# Patient Record
Sex: FEMALE | Race: WHITE | HISPANIC OR LATINO | Employment: PART TIME | URBAN - METROPOLITAN AREA
[De-identification: names, ages, dates, MRNs, and addresses within clinical notes are randomized per-mention and may not be internally consistent; named-entity substitution may affect disease eponyms.]

---

## 2017-02-01 ENCOUNTER — ALLSCRIPTS OFFICE VISIT (OUTPATIENT)
Dept: PERINATAL CARE | Facility: OTHER | Age: 32
End: 2017-02-01
Payer: COMMERCIAL

## 2017-02-01 ENCOUNTER — GENERIC CONVERSION - ENCOUNTER (OUTPATIENT)
Dept: OTHER | Facility: OTHER | Age: 32
End: 2017-02-01

## 2017-02-01 PROCEDURE — 76817 TRANSVAGINAL US OBSTETRIC: CPT | Performed by: OBSTETRICS & GYNECOLOGY

## 2017-02-01 PROCEDURE — 76805 OB US >/= 14 WKS SNGL FETUS: CPT | Performed by: OBSTETRICS & GYNECOLOGY

## 2017-03-29 ENCOUNTER — ALLSCRIPTS OFFICE VISIT (OUTPATIENT)
Dept: PERINATAL CARE | Facility: OTHER | Age: 32
End: 2017-03-29
Payer: COMMERCIAL

## 2017-03-29 ENCOUNTER — GENERIC CONVERSION - ENCOUNTER (OUTPATIENT)
Dept: OTHER | Facility: OTHER | Age: 32
End: 2017-03-29

## 2017-03-29 PROCEDURE — 76816 OB US FOLLOW-UP PER FETUS: CPT | Performed by: OBSTETRICS & GYNECOLOGY

## 2017-05-24 ENCOUNTER — GENERIC CONVERSION - ENCOUNTER (OUTPATIENT)
Dept: OTHER | Facility: OTHER | Age: 32
End: 2017-05-24

## 2017-05-24 ENCOUNTER — APPOINTMENT (OUTPATIENT)
Dept: PERINATAL CARE | Facility: CLINIC | Age: 32
End: 2017-05-24
Payer: COMMERCIAL

## 2017-05-24 DIAGNOSIS — O24.410 GESTATIONAL DIABETES MELLITUS, DIET-CONTROLLED: ICD-10-CM

## 2017-05-24 PROCEDURE — G0108 DIAB MANAGE TRN  PER INDIV: HCPCS | Performed by: OBSTETRICS & GYNECOLOGY

## 2017-05-30 ENCOUNTER — GENERIC CONVERSION - ENCOUNTER (OUTPATIENT)
Dept: OTHER | Facility: OTHER | Age: 32
End: 2017-05-30

## 2017-06-07 ENCOUNTER — ALLSCRIPTS OFFICE VISIT (OUTPATIENT)
Dept: PERINATAL CARE | Facility: OTHER | Age: 32
End: 2017-06-07
Payer: COMMERCIAL

## 2017-06-07 ENCOUNTER — GENERIC CONVERSION - ENCOUNTER (OUTPATIENT)
Dept: OTHER | Facility: OTHER | Age: 32
End: 2017-06-07

## 2017-06-07 PROCEDURE — 76816 OB US FOLLOW-UP PER FETUS: CPT | Performed by: OBSTETRICS & GYNECOLOGY

## 2018-01-10 NOTE — PROGRESS NOTES
MAR 29 2017         RE: Lissa Alcantara                                   To: TODD Ledezma    MR#: [id number]                                   [address] [place] [address] Drive   : [address] [facility]   SS#:                                              Fax: [phone]: [phone]   (Exam #: [id number])      The LMP of this 27year old,  G4, P3-0-0-3 patient was SEP 23 2016, her   working ISSAC is 2017 and the current gestational age is 35 weeks 1   day by [address] [place] [address]  A sonographic examination was performed on MAR   29 2017 using real time equipment  The ultrasound examination was   performed using abdominal technique  The patient has a BMI of 25 7  Her   blood pressure today was 105/65  Earliest ultrasound found in her record: MFM 16  8w6d  ISSAC 17      Cardiac motion was observed at 131 bpm       INDICATIONS      smoker   asthma   fetal growth   Evaluate missed anatomy      Exam Types      Level I      RESULTS      Fetus # 1 of 1   Breech presentation   Fetal growth appeared normal   Placenta Location = Posterior   Placenta Grade = II      MEASUREMENTS (* Included In Average GA)      AC              23 7 cm        27 weeks 6 days* (45%)   BPD              6 8 cm        27 weeks 3 days* (27%)   HC              25 9 cm        27 weeks 6 days* (34%)   Femur            5 8 cm        30 weeks 2 days* (79%)      Cerebellum       3 5 cm        30 weeks 6 days      HC/AC           1 09   FL/AC           0 25   FL/BPD          0 85   EFW (Ac/Fl/Hc)  1283 grams - 2 lbs 13 oz                 (59%)      THE AVERAGE GESTATIONAL AGE is 28 weeks 3 days +/- 14 days        AMNIOTIC FLUID      Q1: 4 4      Q2: 2 4      Q3: 4 7      Q4: 6 4   ROLANDO Total = 18 0 cm   Amniotic Fluid: Normal      ANATOMY DETAILS      Visualized Appearing Sonographically Normal:   HEAD: (Calvarium, BPD Level, Cavum, Lateral Ventricles, Choroid Plexus, Cerebellum, Cisterna Magna);    TH  CAV  : (Lungs, Diaphragm); HEART:   (Four Chamber View, Proximal Left Outflow, Proximal Right Outflow, 3VV,   Aortic Arch, Interventricular Septum, Interatrial Septum, Cardiac Axis,   Cardiac Position);    STOMACH, RIGHT KIDNEY, LEFT KIDNEY, BLADDER, PLACENTA      ANATOMY COMMENTS      The prior fetal anatomic survey was limited the area of the 3VV, Aortic   Arch and Septum  These anatomic views were seen today as sonographically   normal within the inherent limitations of fetal ultrasound  IMPRESSION      Corral IUP   28 weeks and 3 days by this ultrasound  (ISSAC=JUN 18 2017)   28 weeks and 1 day by 1st Tri Sono  (ISSAC=JUN 20 2017)   Breech presentation   Fetal growth appeared normal   Regular fetal heart rate of 131 bpm   Posterior placenta      GENERAL COMMENT      On exam today the patient appears well, in no acute distress, and denies   any complaints  Her abdomen is non-tender  The fetal anatomic survey is now complete  There is no sonographic   evidence of fetal abnormalities at this time  The remainder of the survey   was completed previously  There has been appropriate interval fetal   growth  Good fetal movement and tone are seen  The amniotic fluid volume   appears normal   The placenta is posterior and it appears sonographically   normal   The patient was informed of today's findings and all of her   questions were answered  The limitations of ultrasound were reviewed with   the patient, which she accepts  Precautions and fetal kick counts were   reviewed  We discussed appropriate follow-up in detail and I recommend the patient   return as clinically indicated      Please note, in addition to the time spent discussing the results of the   ultrasound, approximately 10 minutes were spent in consultation with the   patient and coordination of care, with greater than 50% of the time spent   in direct face-to-face counseling        Thank you very much for allowing us to participate in the care of this   very nice patient  Should you have any questions, please do not hesitate   to contact our office  Sami Ferguson, TODD Francis     Electronically signed 03/29/17 08:45

## 2018-01-12 NOTE — PROGRESS NOTES
2016         RE: Ania Lui                                   To: TODD Parham ANDREW    MR#: [id number]                                   [address] [place] [address] Drive   : [address] [facility]  [address]   ENC: [phone]                             Fax: [phone]   (Exam #: [id number])      The LMP of this 27year old,  G4, P3-0-0-3 patient was SEP 23 2016, her   working ISSAC is 2017 and the current gestational age is 11 weeks 6   days by [address] [place] [address]  A sonographic examination was performed on 2016 using real time equipment  The ultrasound examination was   performed using abdominal technique  The patient has a BMI of 23 9  Her   blood pressure today was 104/72  Earliest ultrasound found in her record: MFM 16  8w6d  ISSAC 17      Thank you very much for your kind referral of Ania Lui to the [facility] in [facility] on 2016 for first arrester ultrasound   evaluation and  assessment  Joselyn Wilson is a 59-year-old  4 para   200  female who is currently at 8 0/7 weeks gestation by   menstrual dating  Her prenatal course so far has been unremarkable  Joselyn Wilson   has no complaints  She denies vaginal bleeding  Joselyn Wilson has not yet   received the influenza vaccine during this pregnancy  Joselyn Wilson has a history of 3 prior vaginal deliveries at term between 3 and 14   years ago following uncomplicated prenatal courses  each of her children   is currently healthy  Her past medical history significant for mild,   intermittent asthma, for which she uses an albuterol inhaler as needed  She also has a history depression and an anxiety disorder, neither   currently treated medically  Her past surgical history is negative  She   takes no other medication with the exception of a prenatal vitamin on a   daily basis and has no known drug allergy   She smokes 5-6 cigarettes per   day but denies alcohol or illicit drug use during the pregnancy  The   family genetic history is negative with respect to genetic abnormalities,   birth defects, or mental retardation  Her mom, dad, brother, and sister   have hypertension  The family medical history is negative with respect to   first degree relatives with diabetes or venous thromboembolism  Multiple longitudinal and transverse sections revealed a corral   intrauterine pregnancy  A normal gestational sac was documented  A normal fetal pole was   visualized  Cardiac motion was observed at 163 bpm  The yolk sac was seen,   measuring 0 30 cm  The amnion was also documented  INDICATIONS      confirm gestational age      Exam Types      Level I      MEASUREMENTS (* Included In Average GA)      CRL              2 2 cm        8 weeks 6 days *      THE AVERAGE GESTATIONAL AGE is 8 weeks 6 days +/- 5 days  ANATOMY COMMENTS      Anatomic detail is extremely limited at this gestational age  However, a   discrete fetal pole with cardiac and fetal body motion was documented  Limb buds were documented as well  The yolk sac was clearly seen, and the   gestational sac is normal in appearance and located in the fundus of the   uterus  No gross abnormalities were noted on this examination  Free fluid   is not seen in the posterior cul-de-sac  There is no suspicion of a   subchorionic hematoma  The predicted estimated due date based upon today's   study is June 20, 2017  ADNEXA      The left ovary appeared normal and measured 3 4 x 2 5 x 2 0 cm with a   volume of 8 9 cc  The right ovary appeared normal and measured 2 8 x 2 1 x   2 3 cm with a volume of 7 1 cc        IMPRESSION      Corral IUP   8 weeks and 6 days by this ultrasound  (ISSAC=JUN 20 2017)   8 weeks and 6 days by [email or web address] Tri Sono  (ISSAC=JUN 20 2017)   Regular fetal heart rate of 163 bpm      GENERAL COMMENT      Today's ultrasound findings and suggested follow-up were discussed in   detail with Griffin Vo  She will return to the [facility]  at [facility] in mid December for follow-up MFM evaluation for late first   trimester fetal anatomy evaluation  Genetic screening options will be   discussed that that appointment  Level II ultrasound evaluation will be   performed at about 20 weeks gestation  We discussed that tobacco use   during pregnancy is associated with an increased risk for adverse   pregnancy outcomes, including  delivery, IUGR, abruption, and   stillbirth  We also discussed the importance of receiving the influenza   vaccine during pregnancy  The face to face time, in addition to time spent discussing ultrasound   results, was approximately 10 minutes, greater than 50% of which was spent   during counseling and coordination of care  JOSI Hussein M D     Maternal-Fetal Medicine   Electronically signed 16 09:09

## 2018-01-12 NOTE — PROGRESS NOTES
Active Problems   1  Establish gestational age, ultrasound (V28 3) (Z41)    Family History  Mother   1  Family history of asthma (V17 5) (Z82 5)  2  Family history of hypertension (V17 49) (Z82 49)  Father   3  Family history of asthma (V17 5) (Z82 5)  4  Family history of hypertension (V17 49) (Z82 49)  Sister   5  Family history of asthma (V17 5) (Z82 5)  Brother   6  Family history of asthma (V17 5) (Z82 5)    Social History    · Current every day smoker (305 1) (F17 200)    Current Meds  1  Albuterol Sulfate (2 5 MG/3ML) 0 083% Inhalation Nebulization Solution; Therapy: (Recorded:2016) to Recorded  2  Prenatal TABS; Therapy: (Recorded:2016) to Recorded    Allergies   1  No Known Drug Allergies   2  Animal dander - Cats  3  Animal dander - Dogs  4  FRUIT  5  Peanuts  6  Soy    Provider Comments  Provider Comments:   ISSAC: 2017  EGA: 36 1/7 weeks    Dear Merline Georgia, CRNP:    Your patient, Jhonathan Keen was seen for diabetes and pregnancy counseling and management  As the patient is greater than 36 weeks gestation, her primary treatment for gestational diabetes will be blood glucose monitoring, no concentrated sweets diet and insulin therapy, if needed  The following was reviewed with the patient:    Â· Blood glucose testing during pregnancy: fasting (goal 60 mg/dl to 90 mg/dl), two hour post prandial (goal less than 120 mg/dl)  Patient was instructed on use of the Accu-Chek Felipa blood glucose meter  Fasting blood glucose today was 93 mg/dl  Â· No concentrated sweets diet  Consume protein with every meal and snack  Â· Complications of hyperglycemia during pregnancy including  hypoglycemia and stillbirth  Â· Maternal-fetal surveillance for diabetes care during pregnancy  The following discharged plan was established:     Â· Level I ultrasounds every four weeks at the [facility]  Â· Non-stress testing two times weekly and ROLANDO testing weekly  Â· HbA1c every 6 to 8 weeks (ordered) and mailed to patient  Â· Call my office Friday, 5/26/2017 for evaluation of blood glucose levels  Thank you for the opportunity to participate in the care of this patient  I can be reached at [phone] should you have any questions  Time spent with patient 9:50-10:45 AM; time spent face to face counseling greater than 50% of the appointment      Sincerely,   Abhilash Keith, MS, RD, CDE, LDN  Diabetes Educator  Diabetes [facility] Pregnancy [facility]                Signatures   Electronically signed by : Marina Leyden, RD; May 24 2017 12:37PM EST                       (Author)    Electronically signed by : Pelon Bro; May 24 2017  2:46PM EST                       (Author)    Electronically signed by : Pelon Bro; May 24 2017  2:46PM EST                       (Author)

## 2018-01-13 VITALS
DIASTOLIC BLOOD PRESSURE: 65 MMHG | HEIGHT: 66 IN | BODY MASS INDEX: 24.59 KG/M2 | WEIGHT: 153 LBS | SYSTOLIC BLOOD PRESSURE: 102 MMHG

## 2018-01-13 VITALS
SYSTOLIC BLOOD PRESSURE: 105 MMHG | WEIGHT: 159.05 LBS | HEIGHT: 66 IN | DIASTOLIC BLOOD PRESSURE: 65 MMHG | BODY MASS INDEX: 25.56 KG/M2

## 2018-01-14 VITALS
SYSTOLIC BLOOD PRESSURE: 119 MMHG | BODY MASS INDEX: 25.72 KG/M2 | HEIGHT: 66 IN | DIASTOLIC BLOOD PRESSURE: 79 MMHG | WEIGHT: 160.06 LBS

## 2018-01-14 NOTE — PROGRESS NOTES
DEC 13 2016         RE: Divine Jimenez                                   To: TODD Elam ANDREW    MR#: [id number]                                   [address]   : [address] [facility],  [address]   Mariela Kalee: [phone]                             Fax: [phone]   (Exam #: [id number])      The LMP of this 27year old,  G4, P3-0-0-3 patient was SEP 23 2016, her   working ISSAC is 2017 and the current gestational age is 17 weeks 0   days by  [facility] [address]  A sonographic examination was performed on DEC   13 2016 using real time equipment  The ultrasound examination was   performed using abdominal technique  The patient has a BMI of 23 9  Her   blood pressure today was 106/72  Earliest ultrasound found in her record: MFM 16  8w6d  ISSAC 17   Multiple longitudinal and transverse sections revealed a hammond   intrauterine pregnancy with the fetus in variable presentation  The   placenta is posterior in implantation, grade 0 in appearance  Cardiac motion was observed at 164 bpm       INDICATIONS      first trimester screening   smoker   asthma      Exam Types      Level I   Transvaginal      RESULTS      Fetus # 1 of 1   Variable presentation   Fetal growth appeared normal      MEASUREMENTS (* Included In Average GA)      CRL              6 8 cm        12 weeks 6 days*   Nuchal Trans    1 70 mm      THE AVERAGE GESTATIONAL AGE is 12 weeks 6 days +/- 7 days  ANATOMY COMMENTS      Anatomic detail is limited at this gestational age  The yolk sac was not   noted  The fetal cranium appeared normal in shape and the nuchal   translucency was normal in size (1 7mm)  The nasal bone appears to be   present  The intracranial anatomy was unremarkable  Evaluation of the   spine revealed no obvious evidence for a neural tube defect  Anatomy of   the fetal thorax appeared within normal limits  The cardiac rhythm was   regular  Within the abdomen, stomach & bladder were visualized and the   abdominal wall appeared intact  A three vessel cord appears to be present  Active movement of the fetal body & extremities was seen  There is no   suspicion of a subchorionic bleed  The placental cord insertion was   normal    There is no suspicion of a uterine myoma  Free fluid is not seen   in the posterior cul-de-sac  ADNEXA      The left ovary appeared normal and measured 2 4 x 2 0 x 1 8 cm with a   volume of 4 5 cc  The right ovary appeared normal and measured 2 0 x 1 8 x   1 6 cm with a volume of 3 0 cc       AMNIOTIC FLUID         Largest Vertical Pocket = 2 7 cm   Amniotic Fluid: Normal      IMPRESSION      Corral IUP   12 weeks and 6 days by this ultrasound  (ISSAC=JUN 21 2017)   13 weeks and 0 days by 1st Tri Sono  (ISSAC=JUN 20 2017)   Variable presentation   Fetal growth appeared normal   Regular fetal heart rate of 164 bpm   Posterior placenta      GENERAL COMMENT      Carlie Oquendo reports her NIPT screen was normal and the fetus is female  Recommend a 20 week US for anatomy  JOSI Grant M D     Maternal-Fetal Medicine   Electronically signed 12/14/16 00:25

## 2018-01-15 NOTE — PROGRESS NOTES
2017         RE: Brandy Rueda                                   To: RealTODD Iyer    MR#: [id number]                                   [address]   : [place], [address]   SS#:                                              Fax: Jillian Davalos: [phone]   (Exam #: [id number])      The LMP of this 27year old,  G4, P3-0-0-3 patient was SEP 23 2016, her   working ISSAC is 2017 and the current gestational age is 37 weeks 1   day by [address] [place] [address]  A sonographic examination was performed on 2017 using real time equipment  The ultrasound examination was performed   using abdominal technique  The patient has a BMI of 26 0  Her blood   pressure today was 119/79  Earliest ultrasound found in her record: MFM 16  8w6d  ISSAC 17      Cardiac motion was observed at 125 bpm       INDICATIONS      smoker   asthma   fetal growth   diabetes, gestational, A1      Exam Types      Level I      RESULTS      Fetus # 1 of 1   Vertex presentation   Fetal growth appeared normal   Placenta Location = Posterior   No placenta previa   Placenta Grade = II      MEASUREMENTS (* Included In Average GA)      AC              33 5 cm        37 weeks 4 days* (49%)   BPD              8 9 cm        36 weeks 1 day * (30%)   HC              32 3 cm        36 weeks 0 days* (16%)   Femur            7 3 cm        37 weeks 0 days* (45%)      HC/AC           0 96   FL/AC           0 22   FL/BPD          0 82   EFW (Ac/Fl/Hc)  3143 grams - 6 lbs 15 oz                 (42%)      THE AVERAGE GESTATIONAL AGE is 36 weeks 5 days +/- 21 days        AMNIOTIC FLUID      Q1: 2 1      Q2: 4 4      Q3: 3 2      Q4: 3 8   ROLANDO Total = 13 5 cm   Amniotic Fluid: Normal      ANATOMY DETAILS      Visualized Appearing Sonographically Normal:   HEART: (Four Chamber View, Proximal Left Outflow, Proximal Right Outflow,   3VV, 3 Vessel Trachea, Cardiac Axis, Cardiac Position);    STOMACH, RIGHT   KIDNEY, LEFT KIDNEY, BLADDER, PLACENTA      Visualized:   HEAD: (Calvarium, BPD Level)      Not Visualized:   HEAD: (Cavum, Lateral Ventricles, Choroid Plexus, Cerebellum, Cisterna   Magna)      IMPRESSION      Corral IUP   36 weeks and 5 days by this ultrasound  (ISSAC=2017)   38 weeks and 1 day by 1st Tri Sono  (ISSAC=2017)   Vertex presentation   Fetal growth appeared normal   Regular fetal heart rate of 125 bpm   Posterior placenta   No placenta previa      GENERAL COMMENT      On exam today the patient appears well, in no acute distress, and denies   any complaints  Her abdomen is non-tender  There has been appropriate interval fetal growth  Good fetal movement and   tone are seen  The amniotic fluid volume appears normal   The placenta is   posterior and it appears sonographically normal   The anatomic structures   listed above could not be optimally imaged today because of fetal   position; however, these structures were seen on a prior ultrasound and   appeared sonographically normal   The patient was informed of today's   findings and all of her questions were answered  The limitations of   ultrasound were reviewed with the patient  Fetal kick counts were   reviewed  The patient is an A1 gestational diabetic   surveillance is not   required for this group of patients as long as there is appropriate fetal   growth and normal glucose values  The patient was informed of today's   findings and all of her questions were answered  Recommend that the   patient be delivered by 41  weeks gestation; however, if delivery is   delayed beyond 40 weeks gestation,  testing should be initiated   at that time  Thank you very much for allowing us to participate in the care of this   very nice patient  Should you have any questions, please do not hesitate   to contact our office  JOSI Herrera , JOSI COOPER  TODD Burns     Electronically signed 06/07/17 08:51

## 2018-01-15 NOTE — PROGRESS NOTES
Fluid: Normal      CERVICAL EVALUATION      The cervix appeared normal (Ultrasound Examination)  SUPINE      Cervical Length: 4 30 cm      OTHER TEST RESULTS           Funneling?: No             Dynamic Changes?: No        Resp  To TFP?: No      ANATOMY      Head                                    Normal   Face/Neck                               Normal   Th  Cav  Normal   Heart                                   See Details   Abd  Cav  Normal   Stomach                                 Normal   Right Kidney                            Normal   Left Kidney                             Normal   Bladder                                 Normal   Abd  Wall                               Normal   Spine                                   Normal   Extrems                                 Normal   Genitalia                               Normal   Placenta                                Normal   Umbl  Cord                              Normal   Uterus                                  Normal   PCI                                     Normal      ANATOMY DETAILS      Visualized Appearing Sonographically Normal:   HEAD: (Calvarium, BPD Level, Cavum, Lateral Ventricles, Choroid Plexus,   Cerebellum, Cisterna Magna);    FACE/NECK: (Neck, Nuchal Fold, Profile,   Orbits, Nose/Lips, Palate, Face);    TH  CAV  : (Lungs, Diaphragm); HEART: (Four Chamber View, Proximal Left Outflow, Proximal Right Outflow,   3VV, Short [place] of Greater Vessels, Ductal Arch, IVC, SVC, Cardiac Axis,   Cardiac Position);    ABD  CAV : (Liver);    STOMACH, RIGHT KIDNEY, LEFT   KIDNEY, BLADDER, ABD  WALL, SPINE: (Cervical Spine, Thoracic Spine, Lumbar   Spine, Sacrum);    EXTREMS: (Lt Humerus, Rt Humerus, Lt Forearm, Rt   Forearm, Lt Hand, Rt Hand, Lt Femur, Rt Femur, Lt Low Leg, Rt Low Leg, Lt   Foot, Rt Foot);    GENITALIA, PLACENTA, UMBL   CORD, UTERUS, PCI      Visualized:   HEART: (3 Vessel Trachea, Aortic Arch)      Not Visualized:   HEART: (Interventricular Septum, Interatrial Septum)      ADNEXA      The left ovary appeared normal and measured 3 1 x 2 6 x 1 8 cm with a   volume of 7 6 cc  The right ovary appeared normal and measured 3 2 x 2 2 x   1 7 cm with a volume of 6 3 cc  IMPRESSION      Corral IUP   20 weeks and 1 day by this ultrasound  (ISSAC=JUN 20 2017)   20 weeks and 1 day by 1st Tri Sono  (ISSAC=JUN 20 2017)   Transverse presentation   Fetal growth appeared normal   Regular fetal heart rate of 142 bpm   Posterior placenta   No placenta previa      GENERAL COMMENT      On exam today the patient appears well, in no acute distress, and denies   any complaints  Her abdomen is non-tender  Today's ultrasound is limited by fetal position; therefore, the fetal   anatomic survey could not be completed  Good fetal movement and tone are   seen  The amniotic fluid volume appears normal   The placenta is   posterior and it appears sonographically normal   A transvaginal   ultrasound was performed to assess the cervix, which was not seen well   transabdominally  The cervical length was 4 3 centimeters, which is   normal for the current gestational age  There was no significant   funneling or dynamic changes appreciated  The limitations of ultrasound   were reviewed with the patient, which she appears to understand and   accepts  She was informed of today's findings and all of her questions   were answered  We discussed appropriate follow-up and I recommend Jenaro Boone return in 8 weeks   for a fetal growth evaluation and to complete the fetal anatomic survey  We again discussed the importance of tobacco cessation  Please note, in addition to the time spent discussing the results of the   ultrasound, I spent approximately 10 minutes of face-to-face time with the   patient, greater than 50% of which was spent in counseling and the   coordination of care for this patient  Thank you very much for allowing us to participate in the care of this   very nice patient  Should you have any questions, please do not hesitate   to contact our office  JOSI Santillan M D     Electronically signed 02/01/17 10:44

## 2018-01-22 VITALS
SYSTOLIC BLOOD PRESSURE: 115 MMHG | HEIGHT: 66 IN | BODY MASS INDEX: 26.29 KG/M2 | DIASTOLIC BLOOD PRESSURE: 64 MMHG | WEIGHT: 163.6 LBS

## 2018-03-27 LAB
ABSOL LYMPHOCYTES (HISTORICAL): 3.4 K/UL (ref 0.5–4)
ALBUMIN SERPL BCP-MCNC: 3.7 G/DL (ref 3–5.2)
ALP SERPL-CCNC: 51 U/L (ref 43–122)
ALT SERPL W P-5'-P-CCNC: 33 U/L (ref 9–52)
ANION GAP SERPL CALCULATED.3IONS-SCNC: 11 MMOL/L (ref 5–14)
AST SERPL W P-5'-P-CCNC: 20 U/L (ref 14–36)
BASOPHILS # BLD AUTO: 0 K/UL (ref 0–0.1)
BASOPHILS # BLD AUTO: 1 % (ref 0–1)
BILIRUB SERPL-MCNC: 0.2 MG/DL
BUN SERPL-MCNC: 13 MG/DL (ref 5–25)
CALCIUM SERPL-MCNC: 8.9 MG/DL (ref 8.4–10.2)
CHLORIDE SERPL-SCNC: 106 MEQ/L (ref 97–108)
CHOLEST SERPL-MCNC: 190 MG/DL
CHOLEST/HDLC SERPL: 6.6 {RATIO}
CO2 SERPL-SCNC: 22 MMOL/L (ref 22–30)
CREATINE, SERUM (HISTORICAL): 0.59 MG/DL (ref 0.6–1.2)
DEPRECATED RDW RBC AUTO: 13 %
EGFR (HISTORICAL): >60 ML/MIN/1.73 M2
EOSINOPHIL # BLD AUTO: 0.2 K/UL (ref 0–0.4)
EOSINOPHIL NFR BLD AUTO: 2 % (ref 0–6)
GLUCOSE FASTING (HISTORICAL): 93 MG/DL (ref 70–99)
HCT VFR BLD AUTO: 39.4 % (ref 36–46)
HDLC SERPL-MCNC: 29 MG/DL
HGB BLD-MCNC: 13.2 G/DL (ref 12–16)
LDL/HDL RATIO (HISTORICAL): ABNORMAL
LDLC SERPL CALC-MCNC: ABNORMAL MG/DL
LYMPHOCYTES NFR BLD AUTO: 46 % (ref 25–45)
MCH RBC QN AUTO: 30.4 PG (ref 26–34)
MCHC RBC AUTO-ENTMCNC: 33.4 % (ref 31–36)
MCV RBC AUTO: 91 FL (ref 80–100)
MONOCYTES # BLD AUTO: 0.5 K/UL (ref 0.2–0.9)
MONOCYTES NFR BLD AUTO: 7 % (ref 1–10)
NEUTROPHILS ABS COUNT (HISTORICAL): 3.2 K/UL (ref 1.8–7.8)
NEUTS SEG NFR BLD AUTO: 44 % (ref 45–65)
PLATELET # BLD AUTO: 293 K/MCL (ref 150–450)
POTASSIUM SERPL-SCNC: 3.8 MEQ/L (ref 3.6–5)
RBC # BLD AUTO: 4.34 M/MCL (ref 4–5.2)
SODIUM SERPL-SCNC: 139 MEQ/L (ref 137–147)
TEST INFORMATION (HISTORICAL): NORMAL
TEST RESULT (HISTORICAL): NORMAL
TOTAL PROTEIN (HISTORICAL): 6.5 G/DL (ref 5.9–8.4)
TRIGL SERPL-MCNC: 779 MG/DL
TSH SERPL DL<=0.05 MIU/L-ACNC: 1.4 UIU/ML (ref 0.47–4.68)
VLDLC SERPL CALC-MCNC: ABNORMAL MG/DL (ref 0–40)
WBC # BLD AUTO: 7.2 K/MCL (ref 4.5–11)

## 2018-04-06 LAB
5-HIAA 24 HOUR URINE (HISTORICAL): 3
CATECHOLAMINES, TOTAL (HISTORICAL): ABNORMAL
CREATININE 24 HOUR URINE (HISTORICAL): 1300
CREATININE, UR (HISTORICAL): 52
DOPAMINE 24 HOUR URINE (HISTORICAL): 65
DOPAMINE, RAND UR (HISTORICAL): 26
EPINEPHRINE 24 HOUR URINE (HISTORICAL): <2
EPINEPHRINE, RAND UR (HISTORICAL): <1
INTERPRETATION (HISTORICAL): NORMAL
Lab: 1.2
Lab: 12
Lab: 173
Lab: 2
Lab: 50
Lab: 63
Lab: <2
Lab: NORMAL
METANEPHRINES TOTAL 24 HOUR URINE (HISTORICAL): 82
METANEPHRINES URINE (HISTORICAL): 33
NOREPINEPH, RAND UR (HISTORICAL): 6
NOREPINEPHRINE 24 HOUR URINE (HISTORICAL): 15
NORMETANEPHRINE 24 HOUR URINE (HISTORICAL): 225
NORMETANEPHRINE URINE (HISTORICAL): 90
TIME (HOURS) (HISTORICAL): 24
TOTAL URINE VOLUME (HISTORICAL): 2500

## 2019-10-18 ENCOUNTER — HOSPITAL ENCOUNTER (EMERGENCY)
Facility: HOSPITAL | Age: 34
Discharge: HOME | End: 2019-10-18
Attending: EMERGENCY MEDICINE
Payer: COMMERCIAL

## 2019-10-18 VITALS
BODY MASS INDEX: 24.16 KG/M2 | HEART RATE: 91 BPM | RESPIRATION RATE: 18 BRPM | TEMPERATURE: 97.3 F | OXYGEN SATURATION: 100 % | WEIGHT: 149.69 LBS | SYSTOLIC BLOOD PRESSURE: 122 MMHG | DIASTOLIC BLOOD PRESSURE: 78 MMHG

## 2019-10-18 DIAGNOSIS — J02.9 PHARYNGITIS, UNSPECIFIED ETIOLOGY: Primary | ICD-10-CM

## 2019-10-18 DIAGNOSIS — Z76.0 MEDICATION REFILL: ICD-10-CM

## 2019-10-18 PROCEDURE — 99284 EMERGENCY DEPT VISIT MOD MDM: CPT | Performed by: PHYSICIAN ASSISTANT

## 2019-10-18 PROCEDURE — 99283 EMERGENCY DEPT VISIT LOW MDM: CPT

## 2019-10-18 RX ORDER — ALBUTEROL SULFATE 2.5 MG/3ML
2.5 SOLUTION RESPIRATORY (INHALATION) EVERY 6 HOURS PRN
Qty: 75 ML | Refills: 0 | Status: SHIPPED | OUTPATIENT
Start: 2019-10-18

## 2019-10-18 RX ORDER — ALBUTEROL SULFATE 90 UG/1
2 AEROSOL, METERED RESPIRATORY (INHALATION) EVERY 4 HOURS PRN
Qty: 1 INHALER | Refills: 0 | Status: SHIPPED | OUTPATIENT
Start: 2019-10-18

## 2019-10-18 RX ORDER — AMOXICILLIN 500 MG/1
500 CAPSULE ORAL 3 TIMES DAILY
Qty: 30 CAPSULE | Refills: 0 | Status: SHIPPED | OUTPATIENT
Start: 2019-10-18 | End: 2019-10-28

## 2019-10-18 NOTE — ED PROVIDER NOTES
History  Chief Complaint   Patient presents with    Sore Throat     x 2 days     Fever - 9 weeks to 74 years     x 2 days     Cough     x 2 days  Patient reports yellow sputum and a productive cough  History provided by:  Patient   used: No    Medical Problem   Location:  Pt with fever sore throat and cough  pt ran out in asthma meds   Onset quality:  Gradual  Duration:  5 days  Timing:  Constant  Progression:  Unchanged  Chronicity:  New  Associated symptoms: no abdominal pain, no chest pain, no congestion, no cough, no diarrhea, no ear pain, no fatigue, no fever, no headaches, no loss of consciousness, no myalgias, no nausea, no rash, no rhinorrhea, no shortness of breath, no sore throat, no vomiting and no wheezing        None       History reviewed  No pertinent past medical history  Past Surgical History:   Procedure Laterality Date    NO PAST SURGERIES         Family History   Problem Relation Age of Onset    Brain cancer Maternal Grandmother      I have reviewed and agree with the history as documented  Social History     Tobacco Use    Smoking status: Light Tobacco Smoker     Types: Cigarettes    Smokeless tobacco: Never Used    Tobacco comment: smokes 7 cigarettes per day   Substance Use Topics    Alcohol use: Not on file    Drug use: Not on file        Review of Systems   Constitutional: Negative  Negative for fatigue and fever  HENT: Negative  Negative for congestion, ear pain, rhinorrhea and sore throat  Eyes: Negative  Respiratory: Negative  Negative for cough, shortness of breath and wheezing  Cardiovascular: Negative  Negative for chest pain  Gastrointestinal: Negative  Negative for abdominal pain, diarrhea, nausea and vomiting  Endocrine: Negative  Genitourinary: Negative  Musculoskeletal: Negative  Negative for myalgias  Skin: Negative  Negative for rash  Allergic/Immunologic: Negative  Neurological: Negative    Negative for loss of consciousness and headaches  Hematological: Negative  Psychiatric/Behavioral: Negative  All other systems reviewed and are negative  Physical Exam  Physical Exam   Constitutional: She is oriented to person, place, and time  She appears well-developed and well-nourished  HENT:   Head: Normocephalic  Right Ear: Hearing, tympanic membrane, external ear and ear canal normal    Left Ear: Hearing, tympanic membrane, external ear and ear canal normal    Mouth/Throat: Uvula is midline  Pharyngeal erythema and exudate    Eyes: Pupils are equal, round, and reactive to light  Conjunctivae are normal    Neck: Normal range of motion  Neck supple  Cardiovascular: Normal rate, regular rhythm and normal heart sounds  Pulmonary/Chest: Effort normal and breath sounds normal    Abdominal: Soft  Bowel sounds are normal    Musculoskeletal: Normal range of motion  Lymphadenopathy:     She has cervical adenopathy  Neurological: She is alert and oriented to person, place, and time  Skin: Skin is warm  Capillary refill takes less than 2 seconds  Psychiatric: She has a normal mood and affect  Nursing note and vitals reviewed        Vital Signs  ED Triage Vitals [10/18/19 0912]   Temperature Pulse Respirations Blood Pressure SpO2   (!) 97 3 °F (36 3 °C) 91 18 122/78 100 %      Temp Source Heart Rate Source Patient Position - Orthostatic VS BP Location FiO2 (%)   Tympanic Monitor Sitting Left arm --      Pain Score       --           Vitals:    10/18/19 0912   BP: 122/78   Pulse: 91   Patient Position - Orthostatic VS: Sitting         Visual Acuity      ED Medications  Medications - No data to display    Diagnostic Studies  Results Reviewed     None                 No orders to display              Procedures  Procedures       ED Course                               MDM    Disposition  Final diagnoses:   Pharyngitis, unspecified etiology   Medication refill     Time reflects when diagnosis was documented in both MDM as applicable and the Disposition within this note     Time User Action Codes Description Comment    10/18/2019  9:39 AM Marta Cervantes  Add [J02 9] Pharyngitis, unspecified etiology     10/18/2019  9:41 AM Dewayne Horton  Add [Z76 0] Medication refill       ED Disposition     ED Disposition Condition Date/Time Comment    Discharge Stable Fri Oct 18, 2019  9:39 AM Daniel Stallings discharge to home/self care  Follow-up Information     Follow up With Specialties Details Why Roque Bradley [address]  [phone]            There are no discharge medications for this patient  No discharge procedures on file      ED Provider  Electronically Signed by           Delmi Bower PA-C  10/18/19 7723

## 2021-06-25 ENCOUNTER — HOSPITAL ENCOUNTER (EMERGENCY)
Facility: HOSPITAL | Age: 36
Discharge: HOME | End: 2021-06-25
Attending: EMERGENCY MEDICINE | Admitting: EMERGENCY MEDICINE
Payer: COMMERCIAL

## 2021-06-25 VITALS
SYSTOLIC BLOOD PRESSURE: 186 MMHG | HEART RATE: 129 BPM | WEIGHT: 143.6 LBS | BODY MASS INDEX: 23.18 KG/M2 | DIASTOLIC BLOOD PRESSURE: 98 MMHG | TEMPERATURE: 98.8 F | RESPIRATION RATE: 20 BRPM | OXYGEN SATURATION: 94 %

## 2021-06-25 DIAGNOSIS — J45.41 MODERATE PERSISTENT ASTHMA WITH EXACERBATION: Primary | ICD-10-CM

## 2021-06-25 LAB
EXT PREG TEST URINE: NEGATIVE
EXT. CONTROL ED NAV: NORMAL

## 2021-06-25 PROCEDURE — 99283 EMERGENCY DEPT VISIT LOW MDM: CPT

## 2021-06-25 PROCEDURE — 81025 URINE PREGNANCY TEST: CPT | Performed by: PHYSICIAN ASSISTANT

## 2021-06-25 PROCEDURE — 99284 EMERGENCY DEPT VISIT MOD MDM: CPT | Performed by: PHYSICIAN ASSISTANT

## 2021-06-25 RX ORDER — PREDNISONE 20 MG/1
60 TABLET ORAL ONCE
Status: COMPLETED | OUTPATIENT
Start: 2021-06-25 | End: 2021-06-25

## 2021-06-25 RX ORDER — ALBUTEROL SULFATE 2.5 MG/3ML
5 SOLUTION RESPIRATORY (INHALATION) ONCE
Status: COMPLETED | OUTPATIENT
Start: 2021-06-25 | End: 2021-06-25

## 2021-06-25 RX ORDER — LORAZEPAM 0.5 MG/1
0.5 TABLET ORAL ONCE
Status: COMPLETED | OUTPATIENT
Start: 2021-06-25 | End: 2021-06-25

## 2021-06-25 RX ORDER — PREDNISONE 20 MG/1
40 TABLET ORAL DAILY
Qty: 8 TABLET | Refills: 0 | Status: SHIPPED | OUTPATIENT
Start: 2021-06-25 | End: 2021-06-29

## 2021-06-25 RX ORDER — BENZONATATE 100 MG/1
100 CAPSULE ORAL EVERY 8 HOURS
Qty: 21 CAPSULE | Refills: 0 | Status: SHIPPED | OUTPATIENT
Start: 2021-06-25

## 2021-06-25 RX ORDER — BENZONATATE 100 MG/1
100 CAPSULE ORAL ONCE
Status: COMPLETED | OUTPATIENT
Start: 2021-06-25 | End: 2021-06-25

## 2021-06-25 RX ORDER — GUAIFENESIN 100 MG/5ML
200 SOLUTION ORAL EVERY 4 HOURS PRN
Status: DISCONTINUED | OUTPATIENT
Start: 2021-06-25 | End: 2021-06-25 | Stop reason: HOSPADM

## 2021-06-25 RX ADMIN — LORAZEPAM 0.5 MG: 0.5 TABLET ORAL at 19:52

## 2021-06-25 RX ADMIN — PREDNISONE 60 MG: 20 TABLET ORAL at 19:52

## 2021-06-25 RX ADMIN — ALBUTEROL SULFATE 5 MG: 2.5 SOLUTION RESPIRATORY (INHALATION) at 19:54

## 2021-06-25 RX ADMIN — IPRATROPIUM BROMIDE 0.5 MG: 0.5 SOLUTION RESPIRATORY (INHALATION) at 19:54

## 2021-06-25 RX ADMIN — BENZONATATE 100 MG: 100 CAPSULE ORAL at 19:52

## 2021-06-26 NOTE — ED PROVIDER NOTES
History  Chief Complaint   Patient presents with    Asthma     Pt came to ER for evaluation of asthma exacerbation that did not improve with nebulizer tx at home  Pt also c/o 5 panic attacks  Pt ran out of anxiety meds at home  28-year-old female past medical history of asthma presents to ED for evaluation of suspected asthma exacerbation includes chest tightness, wheezing, cough worsening x2 days  Patient reports that she has been using her at home albuterol nebulizer machine and inhaler without improvement symptoms  Patient's current asthma regime includes albuterol as needed only  Patient is taking no other inhalers at this time  Patient denies any fever  Patient reports that symptoms today are consistent with previous asthma exacerbations  Denies any history of hospitalizations intubations secondary to asthma exacerbation  History provided by:  Patient   used: No    Asthma  Associated symptoms: cough, shortness of breath and wheezing    Associated symptoms: no abdominal pain, no chest pain, no congestion, no diarrhea, no fatigue, no fever, no headaches, no myalgias, no nausea, no rash, no rhinorrhea, no sore throat and no vomiting        Prior to Admission Medications   Prescriptions Last Dose Informant Patient Reported? Taking? albuterol (2 5 mg/3 mL) 0 083 % nebulizer solution   No No   Sig: Take 1 vial (2 5 mg total) by nebulization every 6 (six) hours as needed for wheezing or shortness of breath   albuterol (PROVENTIL HFA,VENTOLIN HFA) 90 mcg/act inhaler   No No   Sig: Inhale 2 puffs every 4 (four) hours as needed for wheezing      Facility-Administered Medications: None       History reviewed  No pertinent past medical history      Past Surgical History:   Procedure Laterality Date    NO PAST SURGERIES         Family History   Problem Relation Age of Onset    Brain cancer Maternal Grandmother      I have reviewed and agree with the history as documented  E-Cigarette/Vaping     E-Cigarette/Vaping Substances     Social History     Tobacco Use    Smoking status: Light Tobacco Smoker     Types: Cigarettes    Smokeless tobacco: Never Used    Tobacco comment: smokes 7 cigarettes per day   Substance Use Topics    Alcohol use: Not on file    Drug use: Not on file       Review of Systems   Constitutional: Negative for appetite change, chills, diaphoresis, fatigue and fever  HENT: Negative for congestion, rhinorrhea and sore throat  Eyes: Negative for photophobia and visual disturbance  Respiratory: Positive for cough, chest tightness, shortness of breath and wheezing  Cardiovascular: Negative for chest pain and palpitations  Gastrointestinal: Negative for abdominal distention, abdominal pain, constipation, diarrhea, nausea and vomiting  Genitourinary: Negative for difficulty urinating, dysuria and hematuria  Musculoskeletal: Negative for back pain, gait problem, myalgias and neck pain  Skin: Negative for color change, rash and wound  Allergic/Immunologic: Negative for immunocompromised state  Neurological: Negative for dizziness, syncope, weakness, light-headedness, numbness and headaches  Hematological: Negative for adenopathy  Does not bruise/bleed easily  Psychiatric/Behavioral: Negative for behavioral problems  Physical Exam  Physical Exam  Vitals and nursing note reviewed  Constitutional:       General: She is not in acute distress  Appearance: Normal appearance  She is well-developed and normal weight  She is not ill-appearing, toxic-appearing or diaphoretic  HENT:      Head: Normocephalic and atraumatic  Right Ear: Tympanic membrane, ear canal and external ear normal       Left Ear: Tympanic membrane, ear canal and external ear normal       Nose: Nose normal  No congestion or rhinorrhea  Mouth/Throat:      Mouth: Mucous membranes are moist       Pharynx: Oropharynx is clear   No oropharyngeal exudate or posterior oropharyngeal erythema  Eyes:      General: No scleral icterus  Right eye: No discharge  Left eye: No discharge  Extraocular Movements: Extraocular movements intact  Conjunctiva/sclera: Conjunctivae normal       Pupils: Pupils are equal, round, and reactive to light  Cardiovascular:      Rate and Rhythm: Normal rate and regular rhythm  Pulses: Normal pulses  Heart sounds: Normal heart sounds  No murmur heard  Pulmonary:      Effort: Pulmonary effort is normal  No respiratory distress  Breath sounds: Wheezing (mod b/l) present  Chest:      Chest wall: No tenderness  Abdominal:      General: Bowel sounds are normal  There is no distension  Palpations: Abdomen is soft  There is no mass  Tenderness: There is no abdominal tenderness  There is no right CVA tenderness, left CVA tenderness, guarding or rebound  Musculoskeletal:         General: No swelling, tenderness, deformity or signs of injury  Normal range of motion  Cervical back: Normal range of motion  No rigidity  No muscular tenderness  Right lower leg: No edema  Left lower leg: No edema  Lymphadenopathy:      Cervical: No cervical adenopathy  Skin:     General: Skin is warm and dry  Capillary Refill: Capillary refill takes less than 2 seconds  Coloration: Skin is not jaundiced or pale  Neurological:      Mental Status: She is alert and oriented to person, place, and time  Motor: No weakness        Gait: Gait normal    Psychiatric:         Behavior: Behavior normal          Vital Signs  ED Triage Vitals [06/25/21 1919]   Temperature Pulse Respirations Blood Pressure SpO2   98 8 °F (37 1 °C) (!) 129 20 (!) 186/98 94 %      Temp Source Heart Rate Source Patient Position - Orthostatic VS BP Location FiO2 (%)   Tympanic Monitor Sitting Left arm --      Pain Score       --           Vitals:    06/25/21 1919   BP: (!) 186/98   Pulse: (!) 129   Patient Position - Orthostatic VS: Sitting         Visual Acuity      ED Medications  Medications   guaiFENesin (ROBITUSSIN) oral solution 200 mg (has no administration in time range)   predniSONE tablet 60 mg (60 mg Oral Given 6/25/21 1952)   albuterol inhalation solution 5 mg (5 mg Nebulization Given 6/25/21 1954)   ipratropium (ATROVENT) 0 02 % inhalation solution 0 5 mg (0 5 mg Nebulization Given 6/25/21 1954)   LORazepam (ATIVAN) tablet 0 5 mg (0 5 mg Oral Given 6/25/21 1952)   benzonatate (TESSALON PERLES) capsule 100 mg (100 mg Oral Given 6/25/21 1952)       Diagnostic Studies  Results Reviewed     Procedure Component Value Units Date/Time    POCT pregnancy, urine [[id number]]  (Normal) Resulted: 06/25/21 1951    Lab Status: Final result Updated: 06/25/21 1954     EXT PREG TEST UR (Ref: Negative) negative     Control valid                 No orders to display              Procedures  Procedures         ED Course                                           MDM  Number of Diagnoses or Management Options  Moderate persistent asthma with exacerbation: new and requires workup  Diagnosis management comments: 79-year-old female past medical history of asthma presents to ED for evaluation of suspected asthma exacerbation includes chest tightness, wheezing, cough worsening x 2 days  Patient reports the symptoms today are consistent with previous asthma exacerbations  Patient was provided nebulized ipratropium and albuterol in the ED along with p o  Prednisone with improvement in subjective symptoms auscultated wheezing  Patient discharged prednisone x4 days, continue albuterol as needed, and Robitussin         Amount and/or Complexity of Data Reviewed  Clinical lab tests: ordered and reviewed  Discuss the patient with other providers: yes  Independent visualization of images, tracings, or specimens: yes    Risk of Complications, Morbidity, and/or Mortality  Presenting problems: moderate  Diagnostic procedures: moderate  Management options: moderate    Patient Progress  Patient progress: stable      Disposition  Final diagnoses: Moderate persistent asthma with exacerbation     Time reflects when diagnosis was documented in both MDM as applicable and the Disposition within this note     Time User Action Codes Description Comment    6/25/2021  8:07 PM Matt Mcarthur Add [J45 41] Moderate persistent asthma with exacerbation       ED Disposition     ED Disposition Condition Date/Time Comment    Discharge Stable Fri Jun 25, 2021  8:07 PM Adelia Rinaldi discharge to home/self care              Follow-up Information     Follow up With Specialties Details Why Contact Info Additional Information    [address] Pulmonology Schedule an appointment as soon as possible for a visit   [address] [place] [phone]  [address] [place] [address], [facility] [address] [place], [place], [place], [address] [phone]          Discharge Medication List as of 6/25/2021  8:08 PM      START taking these medications    Details   benzonatate (TESSALON PERLES) 100 mg capsule Take 1 capsule (100 mg total) by mouth every 8 (eight) hours, Starting Fri 6/25/2021, Normal      guaiFENesin (ROBITUSSIN) 100 MG/5ML oral liquid Take 5-10 mL (100-200 mg total) by mouth every 4 (four) hours as needed for cough, Starting Fri 6/25/2021, Normal      predniSONE 20 mg tablet Take 2 tablets (40 mg total) by mouth daily for 4 days, Starting Fri 6/25/2021, Until Tue 6/29/2021, Normal         CONTINUE these medications which have NOT CHANGED    Details   albuterol (2 5 mg/3 mL) 0 083 % nebulizer solution Take 1 vial (2 5 mg total) by nebulization every 6 (six) hours as needed for wheezing or shortness of breath, Starting Fri 10/18/2019, Print      albuterol (PROVENTIL HFA,VENTOLIN HFA) 90 mcg/act inhaler Inhale 2 puffs every 4 (four) hours as needed for wheezing, Starting Fri 10/18/2019, Print No discharge procedures on file      [address] Review     None          ED Provider  Electronically Signed by           Andrey Varner PA-C  06/25/21 2052       Andrey Varner PA-C  06/25/21 2052

## 2023-02-13 ENCOUNTER — HOSPITAL ENCOUNTER (EMERGENCY)
Facility: HOSPITAL | Age: 38
Discharge: HOME | End: 2023-02-13
Attending: EMERGENCY MEDICINE

## 2023-02-13 VITALS
SYSTOLIC BLOOD PRESSURE: 159 MMHG | WEIGHT: 135.9 LBS | DIASTOLIC BLOOD PRESSURE: 90 MMHG | HEART RATE: 98 BPM | TEMPERATURE: 97.2 F | RESPIRATION RATE: 18 BRPM | OXYGEN SATURATION: 98 % | BODY MASS INDEX: 21.93 KG/M2

## 2023-02-13 DIAGNOSIS — J45.901 ACUTE ASTHMA EXACERBATION: Primary | ICD-10-CM

## 2023-02-13 DIAGNOSIS — J06.9 URI WITH COUGH AND CONGESTION: ICD-10-CM

## 2023-02-13 RX ORDER — PREDNISONE 20 MG/1
60 TABLET ORAL DAILY
Qty: 15 TABLET | Refills: 0 | Status: SHIPPED | OUTPATIENT
Start: 2023-02-13 | End: 2023-02-18

## 2023-02-13 RX ORDER — ALBUTEROL SULFATE 2.5 MG/3ML
2.5 SOLUTION RESPIRATORY (INHALATION) EVERY 6 HOURS PRN
Qty: 75 ML | Refills: 0 | Status: SHIPPED | OUTPATIENT
Start: 2023-02-13

## 2023-02-13 RX ORDER — PREDNISONE 20 MG/1
60 TABLET ORAL ONCE
Status: COMPLETED | OUTPATIENT
Start: 2023-02-13 | End: 2023-02-13

## 2023-02-13 RX ORDER — GUAIFENESIN 600 MG/1
1200 TABLET, EXTENDED RELEASE ORAL EVERY 12 HOURS SCHEDULED
Qty: 20 TABLET | Refills: 0 | Status: SHIPPED | OUTPATIENT
Start: 2023-02-13

## 2023-02-13 RX ORDER — IPRATROPIUM BROMIDE AND ALBUTEROL SULFATE 2.5; .5 MG/3ML; MG/3ML
3 SOLUTION RESPIRATORY (INHALATION) ONCE
Status: COMPLETED | OUTPATIENT
Start: 2023-02-13 | End: 2023-02-13

## 2023-02-13 RX ADMIN — IPRATROPIUM BROMIDE AND ALBUTEROL SULFATE 3 ML: .5; 3 SOLUTION RESPIRATORY (INHALATION) at 13:38

## 2023-02-13 RX ADMIN — PREDNISONE 60 MG: 20 TABLET ORAL at 13:38

## 2023-02-13 NOTE — ED PROVIDER NOTES
History  Chief Complaint   Patient presents with   • Asthma     Has had chronic runny nose and cough due to seasonal allergies; asthma has been acting up; all the congestion makes it feel like choking when trying to eat  54-year-old female past medical history of asthma, hypertension, seasonal allergies presents to emergency department complaining of 5 days of congestion, cough, worsening asthma symptoms  Reports chest tightness, wheezing unrelieved by albuterol inhaler  Denies fever, chills, chest pain, hemoptysis, sore throat, sinus pain, vision change, ear pain, neck pain or stiffness, rash  Denies past medical history of hospitalization or intubation for asthma  Is not vaccinated for COVID-19 or influenza  History provided by:  Patient  Asthma  Associated symptoms: congestion, cough and wheezing    Associated symptoms: no abdominal pain, no chest pain, no fever, no headaches, no rash, no shortness of breath, no sore throat and no vomiting        Prior to Admission Medications   Prescriptions Last Dose Informant Patient Reported? Taking?    albuterol (2 5 mg/3 mL) 0 083 % nebulizer solution   No No   Sig: Take 1 vial (2 5 mg total) by nebulization every 6 (six) hours as needed for wheezing or shortness of breath   albuterol (PROVENTIL HFA,VENTOLIN HFA) 90 mcg/act inhaler   No No   Sig: Inhale 2 puffs every 4 (four) hours as needed for wheezing   benzonatate (TESSALON PERLES) 100 mg capsule   No No   Sig: Take 1 capsule (100 mg total) by mouth every 8 (eight) hours   guaiFENesin (ROBITUSSIN) 100 MG/5ML oral liquid   No No   Sig: Take 5-10 mL (100-200 mg total) by mouth every 4 (four) hours as needed for cough      Facility-Administered Medications: None       Past Medical History:   Diagnosis Date   • Asthma    • Hypertension    • Seasonal allergies        Past Surgical History:   Procedure Laterality Date   • NO PAST SURGERIES         Family History   Problem Relation Age of Onset   • Brain cancer Maternal Grandmother      I have reviewed and agree with the history as documented  E-Cigarette/Vaping   • E-Cigarette Use Never User      E-Cigarette/Vaping Substances     Social History     Tobacco Use   • Smoking status: Light Smoker     Types: Cigarettes   • Smokeless tobacco: Never   • Tobacco comments:     smokes 7 cigarettes per day   Vaping Use   • Vaping Use: Never used   Substance Use Topics   • Alcohol use: Yes   • Drug use: Never       Review of Systems   Constitutional: Negative for appetite change, chills, fever and unexpected weight change  HENT: Positive for congestion  Negative for sore throat  Eyes: Negative for pain and visual disturbance  Respiratory: Positive for cough, chest tightness and wheezing  Negative for shortness of breath  Cardiovascular: Negative for chest pain  Gastrointestinal: Negative for abdominal pain and vomiting  Musculoskeletal: Negative for neck pain  Skin: Negative for color change and rash  Neurological: Negative for dizziness, syncope, weakness, light-headedness and headaches  All other systems reviewed and are negative  Physical Exam  Physical Exam  Vitals and nursing note reviewed  Constitutional:       General: She is awake  She is not in acute distress  Appearance: Normal appearance  She is not ill-appearing, toxic-appearing or diaphoretic  HENT:      Head: Normocephalic  Right Ear: External ear normal       Left Ear: External ear normal       Nose: Congestion present  Right Turbinates: Swollen  Left Turbinates: Swollen  Right Sinus: No maxillary sinus tenderness or frontal sinus tenderness  Left Sinus: No maxillary sinus tenderness or frontal sinus tenderness  Mouth/Throat:      Lips: Pink  Mouth: Mucous membranes are moist       Pharynx: Oropharynx is clear  Eyes:      General: Lids are normal  Vision grossly intact        Conjunctiva/sclera: Conjunctivae normal    Cardiovascular:      Rate and Rhythm: Normal rate and regular rhythm  Heart sounds: Normal heart sounds  Pulmonary:      Effort: Pulmonary effort is normal  No respiratory distress  Breath sounds: No stridor  Wheezing present  No rales  Comments: Patient in no respiratory distress, speaking in full sentences, managing oral secretions without difficulty, no accessory muscle use, retractions, or belly breathing noted  No focal lung findings B/L  Abdominal:      General: There is no distension  Musculoskeletal:      Cervical back: Neck supple  Right lower leg: No edema  Left lower leg: No edema  Skin:     General: Skin is warm and dry  Capillary Refill: Capillary refill takes less than 2 seconds  Findings: No rash  Neurological:      Mental Status: She is alert  Vital Signs  ED Triage Vitals [02/13/23 1255]   Temperature Pulse Respirations Blood Pressure SpO2   (!) 97 2 °F (36 2 °C) 98 18 159/90 98 %      Temp Source Heart Rate Source Patient Position - Orthostatic VS BP Location FiO2 (%)   Oral Monitor Sitting Left arm --      Pain Score       No Pain           Vitals:    02/13/23 1255   BP: 159/90   Pulse: 98   Patient Position - Orthostatic VS: Sitting         Visual Acuity      ED Medications  Medications   predniSONE tablet 60 mg (60 mg Oral Given 2/13/23 1338)   ipratropium-albuterol (DUO-NEB) 0 5-2 5 mg/3 mL inhalation solution 3 mL (3 mL Nebulization Given 2/13/23 1338)       Diagnostic Studies  Results Reviewed     Procedure Component Value Units Date/Time    FLU/COVID - if FLU clinically relevant [[id number]]  (Normal) Collected: 02/13/23 1337    Lab Status: Final result Specimen: Nares from Nose Updated: 02/14/23 1232     SARS-CoV-2 Negative     INFLUENZA A PCR Negative     INFLUENZA B PCR Negative    Narrative:      FOR PEDIATRIC PATIENTS - copy/paste COVID Guidelines URL to browser: [email or web address]    SARS-CoV-2 assay is a Nucleic Acid Amplification assay intended for the  qualitative detection of nucleic acid from SARS-CoV-2 in nasopharyngeal  swabs  Results are for the presumptive identification of SARS-CoV-2 RNA  Positive results are indicative of infection with SARS-CoV-2, the virus  causing COVID-19, but do not rule out bacterial infection or co-infection  with other viruses  Laboratories within the [place] and its  territories are required to report all positive results to the appropriate  public health authorities  Negative results do not preclude SARS-CoV-2  infection and should not be used as the sole basis for treatment or other  patient management decisions  Negative results must be combined with  clinical observations, patient history, and epidemiological information  This test has not been FDA cleared or approved  This test has been authorized by FDA under an Emergency Use Authorization  (EUA)  This test is only authorized for the duration of time the  declaration that circumstances exist justifying the authorization of the  emergency use of an in vitro diagnostic tests for detection of SARS-CoV-2  virus and/or diagnosis of COVID-19 infection under section 564(b)(1) of  the Act, 21 U  S C  544CWU-6(T)(7), unless the authorization is terminated  or revoked sooner  The test has been validated but independent review by FDA  and CLIA is pending  Test performed using the Roche shira [id number] System: This RT-PCR assay  targets ORF1, a region unique to SARS-CoV-2  A conserved region in the  E-gene was chosen for pan-Sarbecovirus detection which includes  SARS-CoV-2  According to CMS-2020-01-R, this platform meets the definition of high-throughput technology  No orders to display              Procedures  Procedures         ED Course  ED Course as of 02/15/23 Stone Pretty Feb 13, 2023   1314 Reports for the past 3 months has been having intermittent episodes of congestion  It had resolved    4 to 5 days ago she began with congestion, cough, chest tightness, wheezing  Takes daily Advair, albuterol as needed  Last took her inhaler 2 hours prior to arrival   Denies history of hospitalization or intubation for asthma  Did not take any medications for cough or congestion  Takes daily fluticasone spray  1402 Patient reports improvement of chest tightness, wheezing  States she feels much better  Wheezing improved on exam  Patient in no respiratory distress, speaking in full sentences, managing oral secretions without difficulty, no accessory muscle use, retractions, or belly breathing noted,  Medical Decision Making  Past medical history of asthma  Recent URI with cough  presenting for worsening asthma symptoms  DDX includes but not limited to rhinitis, sinusitis, pertussis, bronchitis, PNA, influenza, Covid-19, allergies, asthma, parainfluenza  She has bilateral wheezing noted on exam  Patient presenting with hx and physical exam consistent with acute asthma exacerbation  There were no focal lung findings on exam, low clinical suspicion for pneumonia, she is afebrile  Breath sounds equal bilaterally  Do not feel further imaging including chest x-ray is necessary at this time  Patient treated with steroids and inhaled beta-agonists and has shown significant improvement  Respiratory exam much improved and patient feels well, will discharge home with continued steroids and beta-agonists  Instructed patient to return if any worsening in symptoms  Tested for COVID-19/influenza  Plan for supportive care at home for URI symptoms  All imaging and/or lab testing discussed with patient, strict return to ED precautions discussed  Patient recommended to follow up promptly with appropriate outpatient provider  Patient and/or family members verbalizes understanding and agrees with plan   Patient and/or family members were given opportunity to ask questions, all questions were answered at this time  Patient is stable for discharge      Portions of the record may have been created with voice recognition software  Occasional wrong word or "sound a like" substitutions may have occurred due to the inherent limitations of voice recognition software  Read the chart carefully and recognize, using context, where substitutions have occurred  Acute asthma exacerbation: complicated acute illness or injury  URI with cough and congestion: complicated acute illness or injury  Amount and/or Complexity of Data Reviewed  Labs: ordered  Risk  OTC drugs  Prescription drug management  Disposition  Final diagnoses:   URI with cough and congestion   Acute asthma exacerbation     Time reflects when diagnosis was documented in both MDM as applicable and the Disposition within this note     Time User Action Codes Description Comment    2/13/2023  1:38 PM Fabián Rower Add [J06 9] URI with cough and congestion     2/13/2023  1:38 PM Fabián Rower Add [R63 054] Acute asthma exacerbation     2/13/2023  2:03 PM Fabián Rower Modify [J06 9] URI with cough and congestion     2/13/2023  2:03 PM Fabián Kwaner Modify [G70 038] Acute asthma exacerbation       ED Disposition     ED Disposition   Discharge    Condition   Stable    Date/Time   Mon Feb 13, 2023  2:03 PM    Comment   Jennifer Pedersen discharge to home/self care                 Follow-up Information     Follow up With Specialties Details Why Radha David MD Obstetrics and Gynecology, Obstetrics, Gynecology Schedule an appointment as soon as possible for a visit  For follow up regarding your symptoms [address] [place] [address]  [phone]            Discharge Medication List as of 2/13/2023  2:03 PM      START taking these medications    Details   !! albuterol (2 5 mg/3 mL) 0 083 % nebulizer solution Take 3 mL (2 5 mg total) by nebulization every 6 (six) hours as needed for wheezing or shortness of breath, Starting Mon 2/13/2023, Normal      guaiFENesin (MUCINEX) 600 mg 12 hr tablet Take 2 tablets (1,200 mg total) by mouth every 12 (twelve) hours, Starting Mon 2/13/2023, Normal      predniSONE 20 mg tablet Take 3 tablets (60 mg total) by mouth daily for 5 days, Starting Mon 2/13/2023, Until Sat 2/18/2023, Normal      sodium chloride (OCEAN) 0 65 % nasal spray 1 spray into each nostril as needed for congestion (as needed for congestion), Starting Mon 2/13/2023, Until Tue 2/13/2024 at 2359, Normal       !! - Potential duplicate medications found  Please discuss with provider  CONTINUE these medications which have NOT CHANGED    Details   !! albuterol (2 5 mg/3 mL) 0 083 % nebulizer solution Take 1 vial (2 5 mg total) by nebulization every 6 (six) hours as needed for wheezing or shortness of breath, Starting Fri 10/18/2019, Print      albuterol (PROVENTIL HFA,VENTOLIN HFA) 90 mcg/act inhaler Inhale 2 puffs every 4 (four) hours as needed for wheezing, Starting Fri 10/18/2019, Print      benzonatate (TESSALON PERLES) 100 mg capsule Take 1 capsule (100 mg total) by mouth every 8 (eight) hours, Starting Fri 6/25/2021, Normal      guaiFENesin (ROBITUSSIN) 100 MG/5ML oral liquid Take 5-10 mL (100-200 mg total) by mouth every 4 (four) hours as needed for cough, Starting Fri 6/25/2021, Normal       !! - Potential duplicate medications found  Please discuss with provider  No discharge procedures on file      PDMP Review     None          ED Provider  Electronically Signed by           Arina López PA-C  02/15/23 Marsha Garcias

## 2023-02-13 NOTE — DISCHARGE INSTRUCTIONS
Take steroid as prescribed  Use albuterol every 4-6 hours as needed  we will call you with the results of your COVID-1/Influenza test  They will also be available on your MyChart  Please be patient at this time as we have a very large volume of tests and it may take a couple of days to come back  Stay home until the results are received, then follow the appropriate CDC quarantine/isolation guidelines based on your vaccinations status and symptoms  Follow up with your Primary Care Provider  Return to ED for new or worsening symptoms as discussed

## 2023-02-14 LAB
FLUAV RNA RESP QL NAA+PROBE: NEGATIVE
FLUBV RNA RESP QL NAA+PROBE: NEGATIVE
SARS-COV-2 RNA RESP QL NAA+PROBE: NEGATIVE

## 2024-12-10 ENCOUNTER — OFFICE VISIT (OUTPATIENT)
Dept: OBGYN CLINIC | Facility: CLINIC | Age: 39
End: 2024-12-10

## 2024-12-10 VITALS
BODY MASS INDEX: 23.46 KG/M2 | HEIGHT: 66 IN | DIASTOLIC BLOOD PRESSURE: 77 MMHG | WEIGHT: 146 LBS | HEART RATE: 75 BPM | SYSTOLIC BLOOD PRESSURE: 119 MMHG

## 2024-12-10 DIAGNOSIS — Z71.85 HPV VACCINE COUNSELING: ICD-10-CM

## 2024-12-10 DIAGNOSIS — Z01.419 ENCOUNTER FOR ANNUAL ROUTINE GYNECOLOGICAL EXAMINATION: Primary | ICD-10-CM

## 2024-12-10 DIAGNOSIS — Z30.011 ENCOUNTER FOR INITIAL PRESCRIPTION OF CONTRACEPTIVE PILLS: ICD-10-CM

## 2024-12-10 DIAGNOSIS — Z23 NEED FOR HPV VACCINE: ICD-10-CM

## 2024-12-10 DIAGNOSIS — Z12.31 ENCOUNTER FOR SCREENING MAMMOGRAM FOR MALIGNANT NEOPLASM OF BREAST: ICD-10-CM

## 2024-12-10 PROCEDURE — 90471 IMMUNIZATION ADMIN: CPT | Performed by: NURSE PRACTITIONER

## 2024-12-10 PROCEDURE — 99386 PREV VISIT NEW AGE 40-64: CPT | Performed by: NURSE PRACTITIONER

## 2024-12-10 PROCEDURE — G0145 SCR C/V CYTO,THINLAYER,RESCR: HCPCS | Performed by: PATHOLOGY

## 2024-12-10 PROCEDURE — 87624 HPV HI-RISK TYP POOLED RSLT: CPT | Performed by: NURSE PRACTITIONER

## 2024-12-10 PROCEDURE — 90651 9VHPV VACCINE 2/3 DOSE IM: CPT | Performed by: NURSE PRACTITIONER

## 2024-12-10 RX ORDER — LORATADINE 10 MG/1
10 TABLET ORAL DAILY
COMMUNITY
Start: 2024-10-21

## 2024-12-10 RX ORDER — AMLODIPINE BESYLATE 5 MG/1
5 TABLET ORAL DAILY
COMMUNITY
Start: 2024-10-21

## 2024-12-10 RX ORDER — FENOFIBRATE 48 MG/1
48 TABLET, COATED ORAL DAILY
COMMUNITY
Start: 2024-10-21 | End: 2025-10-21

## 2024-12-10 RX ORDER — LORAZEPAM 0.5 MG/1
0.5 TABLET ORAL 2 TIMES DAILY PRN
COMMUNITY
Start: 2024-07-12

## 2024-12-10 RX ORDER — BUPROPION HYDROCHLORIDE 150 MG/1
1 TABLET ORAL EVERY MORNING
COMMUNITY
Start: 2024-10-21 | End: 2025-10-21

## 2024-12-10 RX ORDER — NORETHINDRONE ACETATE AND ETHINYL ESTRADIOL 1MG-20(21)
1 KIT ORAL DAILY
Qty: 28 TABLET | Refills: 3 | Status: SHIPPED | OUTPATIENT
Start: 2024-12-10

## 2024-12-10 RX ORDER — ESCITALOPRAM OXALATE 10 MG/1
10 TABLET ORAL DAILY
COMMUNITY
Start: 2024-10-21

## 2024-12-10 NOTE — PATIENT INSTRUCTIONS
PAP results can take up to 2 weeks  Keep  mammogram appointment  Call with needs or concerns  Start birth control pills today  Return in 3 months to follow up birth control pills today  Return in 1 year annual GYN exam  Return in 2 & 4 months for the HPV vaccine  HOPE Line [phone]    Birth Control Pills   WHAT YOU NEED TO KNOW:   Birth control pills are also called oral contraceptives, or the pill. It is medicine that helps prevent pregnancy. Birth control pills work by preventing ovulation. Ovulation is when the ovaries make and release an egg cell each month. If this egg gets fertilized by sperm, pregnancy occurs. Birth control pills may also help to prevent pregnancy by keeping sperm from fertilizing an egg.   DISCHARGE INSTRUCTIONS:   Follow up with your healthcare provider as directed:  Write down your questions so you remember to ask them during your visits.  Advantages of birth control pills:  When birth control pills are used correctly, the chances of getting pregnant are very low. Birth control pills may help decrease bleeding and pain during your monthly period. They may also help prevent cancer of the uterus and ovaries.  Disadvantages of birth control pills:  You may have sudden changes in your mood or feelings while you take birth control pills. You may have nausea and decreased sex drive. You may have an increased appetite and rapid weight gain. You may also have bleeding in between periods, less frequent periods, vaginal dryness, and breast pain. Birth control pills will not protect you from sexually transmitted infections. Rarely, some birth control pills can increase your risk for a blood clot. This may become life-threatening.   If you want to get pregnant:  If you are planning to have a baby, ask your healthcare provider when you may stop taking your birth control pills. It may take some time for you to start ovulating again. Ask your healthcare provider for more information about pregnancy  after birth control pills.  When to start taking birth control pills after you have a baby:  If you are not breastfeeding, you may start taking birth control pills 3 weeks after you give birth. You may be able to take certain types of birth control pills if you are breastfeeding. These pills can be started from 6 weeks to 6 months after you give birth. Ask your healthcare provider for more information about when to start taking birth control pills after you give birth.  Contact your healthcare provider if:   You have forgotten to take a birth control pill.    You have mood changes, such as depression, since starting birth control pills.    You have nausea or you are vomiting.    You have severe abdominal pain.    You missed a period and have questions or concerns about being pregnant.    You still have bleeding 4 months after taking birth control pills correctly.    You have questions or concerns about your condition or care.  Return to the emergency department if:   Your arm or leg feels warm, tender, and painful. It may look swollen and red.    You feel lightheaded, short of breath, and have chest pain.    You cough up blood.    You have any of the following signs of a stroke:      Numbness or drooping on one side of your face     Weakness in an arm or leg    Confusion or difficulty speaking    Dizziness, a severe headache, or vision loss    You have severe pain, numbness, or swelling in your arms or legs.  © 2017 Rep Information is for End User's use only and may not be sold, redistributed or otherwise used for commercial purposes. All illustrations and images included in CareNotes® are the copyrighted property of [employer] or [employer].  The above information is an  only. It is not intended as medical advice for individual conditions or treatments. Talk to your doctor, nurse or pharmacist before following any medical regimen to see if it is safe and  effective for you.    Missed or Late Pills: For combined (Estrogen and Progestin) pills:    If one hormonal pill is LATE (<24 hours since a pill should have been taken): Take the late or missed pill as soon as possible. Continue taking the remaining pills at the usual time (even if it means taking two pills on the same day). No additional contraceptive protection is needed. Emergency contraception is not usually needed but can be considered if hormonal pills were missed earlier in the cycle or in the last week of the previous cycle.     If one hormonal pill has been MISSED (24 to <48 hours since a pill should have been taken): Take the late or missed pill as soon as possible. Continue taking the remaining pills at the usual time (even if it means taking two pills on the same day). No additional contraceptive protection is needed. Emergency contraception is not usually needed but can be considered if hormonal pills were missed earlier in the cycle or in the last week of the previous cycle.     If two or more consecutive hormonal pills have been missed: (less than or equal to 48 hours since a pill should have been taken): Take the most recent missed pill as soon as possible. (Any other missed pills should be discarded.) Continue taking the remaining pills at the usual time (even if it means taking two pills on the same day). Use back-up contraception (e.g., condoms) or avoid sexual intercourse until hormonal pills have been taken for 7 consecutive days. If pills were missed in the last week of hormonal pills (e.g., days 15-21 for 28-day pill packs): Omit the hormone-free interval by finishing the horomal pills in the current pack and starting a new pack the next day. If unable to start a new pack immediately, use back-up contraception (e.g., condoms) or avoid sexual intercourse until hormonal pills from a new pack have been taken for 7 consecutive days. Emergency contraception should be considered if hormonal pills  were missed during the first week and unprotected sexual intercourse occurred in the previous 5 days. Emergency contraception may also be considered at other times as appropriate.     Source: [place] Selected Practice Recommendations for Contraceptive Use, 2013.

## 2024-12-10 NOTE — PROGRESS NOTES
Annual Exam    Assessment   1. Encounter for annual routine gynecological examination  Liquid-based pap, screening      2. Encounter for screening mammogram for malignant neoplasm of breast  Mammo screening bilateral w 3d and cad      3. Encounter for initial prescription of contraceptive pills  norethindrone-ethinyl estradiol (Loestrin Fe ) 1-20 MG-MCG per tablet      4. HPV vaccine counseling        5. Need for HPV vaccine  HPV Vaccine 9 valent IM        well woman       Plan       All questions answered.  Breast self exam technique reviewed and patient encouraged to perform self-exam monthly.  Contraception: condoms.  Discussed healthy lifestyle modifications.  Follow up in 1 year.  Thin prep Pap smear.     Patient Instructions   PAP results can take up to 2 weeks  Keep  mammogram appointment  Call with needs or concerns  Start birth control pills today  Return in 3 months to follow up birth control pills today  Return in 1 year annual GYN exam  Return in 2 & 4 months for the HPV vaccine  HOPE Line [phone]    Pt verbalized understanding of all discussed.      Subjective      Angelique Vasquez is a 39 y.o.  female who presents for annual well woman exam. Periods are regular every 28-30 days, lasting 3 days. No intermenstrual bleeding, spotting, or discharge.  Pt states she thinks last PAP was 2019 and states all PAP's have been WNL  1 partner x 10 years, denies domestic violence  Pt states she has Hot Flashes at night, discussed comfort measures  Never had HPV vaccine, discussed benefit, pt requested first vaccine today, vaccine provided    Depression Screening Follow-up Plan: Patient's depression screening was negative with a PHQ-2 score of 4. Their PHQ-9 score was 5. Clinically patient does not have depression. No treatment is required.      Current contraception: condoms, would like to restart OCP's was on in the past safe and effective use provided  History of abnormal Pap smear: no  Family history  "of uterine or ovarian cancer: aunt uterine  Regular self breast exam: yes  History of abnormal mammogram: First ordered today  Family history of breast cancer: yes - Grandmother and Aunt, HOPE Line provided  History of abnormal lipids: unknown  Menstrual History:  OB History          5    Para   4    Term   4       0    AB   1    Living   4         SAB   1    IAB   0    Ectopic   0    Multiple   0    Live Births   4                Menarche age: 10  Patient's last menstrual period was 2024 (exact date).       The following portions of the patient's history were reviewed and updated as appropriate: allergies, current medications, past family history, past medical history, past social history, past surgical history and problem list.    Review of Systems  Pertinent items are noted in HPI.      Objective      /77 (BP Location: Right arm, Patient Position: Sitting, Cuff Size: Standard)   Pulse 75   Ht 5' 6\" (1.676 m)   Wt 66.2 kg (146 lb)   LMP 2024 (Exact Date)   BMI 23.57 kg/m²     General: alert and oriented, in no acute distress, alert, appears stated age, and cooperative   Heart: NSR   Lungs: clear to auscultation bilaterally, WNL respiratory effort, negative cough or SOB   Thyroid: Negative masses palpable   Abdomen: soft, non-tender, without masses or organomegaly palpable   Vulva: normal   Vagina: normal mucosa   Cervix: no cervical motion tenderness and no lesions   Uterus: normal size, non-tender, normal shape and consistency   Adnexa: normal adnexa   Urethra: normal   Breasts: NT,negative masses palpable, negative discharge, or dimpling             "

## 2024-12-18 DIAGNOSIS — Z01.419 ENCOUNTER FOR ANNUAL ROUTINE GYNECOLOGICAL EXAMINATION: Primary | ICD-10-CM

## 2024-12-18 LAB
HPV HR 12 DNA CVX QL NAA+PROBE: NEGATIVE
HPV16 DNA CVX QL NAA+PROBE: POSITIVE
HPV18 DNA CVX QL NAA+PROBE: NEGATIVE
LAB AP GYN PRIMARY INTERPRETATION: ABNORMAL
Lab: ABNORMAL
PATH INTERP SPEC-IMP: ABNORMAL

## 2024-12-18 PROCEDURE — G0124 SCREEN C/V THIN LAYER BY MD: HCPCS | Performed by: PATHOLOGY

## 2024-12-19 ENCOUNTER — TELEPHONE (OUTPATIENT)
Dept: OBGYN CLINIC | Facility: CLINIC | Age: 39
End: 2024-12-19

## 2024-12-19 PROBLEM — R87.613 HGSIL ON PAP SMEAR OF CERVIX: Status: ACTIVE | Noted: 2024-12-19

## 2024-12-19 NOTE — TELEPHONE ENCOUNTER
Called Angelique to explain HGSIL PAP with HPV 16 positive and need for colpo. Pt states she had an abnormal PAP and a colpo but pt states she then lost her insurance and could not F/U. Pt was provided an appointment for 1/14/2025 at 0830 for a colpo.  Pt verbalized understanding of all discussed.

## 2025-02-11 ENCOUNTER — PROCEDURE VISIT (OUTPATIENT)
Dept: OBGYN CLINIC | Facility: CLINIC | Age: 40
End: 2025-02-11

## 2025-02-11 VITALS
DIASTOLIC BLOOD PRESSURE: 88 MMHG | SYSTOLIC BLOOD PRESSURE: 126 MMHG | WEIGHT: 150.6 LBS | OXYGEN SATURATION: 98 % | HEIGHT: 66 IN | BODY MASS INDEX: 24.2 KG/M2 | HEART RATE: 78 BPM

## 2025-02-11 DIAGNOSIS — R87.613 HGSIL ON PAP SMEAR OF CERVIX: Primary | ICD-10-CM

## 2025-02-11 PROCEDURE — 81025 URINE PREGNANCY TEST: CPT | Performed by: OBSTETRICS & GYNECOLOGY

## 2025-02-11 PROCEDURE — 99213 OFFICE O/P EST LOW 20 MIN: CPT | Performed by: OBSTETRICS & GYNECOLOGY

## 2025-02-11 NOTE — H&P (VIEW-ONLY)
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  - Reviewed ASCCP guidelines with patient that treatment is preferred with her pap smear results of HSIL and HPV 16+  - Discussed that LEEP is both diagnostic and therapeutic  - We reviewed risks and benefits of LEEP including bleeding, infection, inadequate sample requiring additional procedure, or pathology results of cancer requiring further procedure  - Consent signed for EUA, LEEP, all other indicated procedures  - Message sent to surgery scheduler  - Patient is aware she should be NPO after midnight and that this is a same day procedure      History of Present Illness     Angelique Vasquez is a 39 y.o. female who presents for pre-op for LEEP procedure. She has no current complaints or questions.      Review of Systems   Constitutional: Negative.    Respiratory: Negative.     Cardiovascular: Negative.    Gastrointestinal: Negative.    Genitourinary: Negative.    Neurological: Negative.    Psychiatric/Behavioral: Negative.       Past Medical History   Past Medical History:   Diagnosis Date    Anxiety     Asthma     Depression     Hypertension     Seasonal allergies      Past Surgical History:   Procedure Laterality Date    NO PAST SURGERIES       Family History   Problem Relation Age of Onset    Brain cancer Maternal Grandmother     Breast cancer Maternal Aunt       reports that she has been smoking cigarettes. She has never used smokeless tobacco. She reports current alcohol use. She reports that she does not use drugs.  Current Outpatient Medications on File Prior to Visit   Medication Sig Dispense Refill    albuterol (2.5 mg/3 mL) 0.083 % nebulizer solution Take 3 mL (2.5 mg total) by nebulization every 6 (six) hours as needed for wheezing or shortness of breath 75 mL 0     albuterol (PROVENTIL HFA,VENTOLIN HFA) 90 mcg/act inhaler Inhale 2 puffs every 4 (four) hours as needed for wheezing 1 Inhaler 0    amLODIPine (NORVASC) 5 mg tablet Take 5 mg by mouth daily      buPROPion (WELLBUTRIN XL) 150 mg 24 hr tablet Take 1 tablet by mouth every morning      escitalopram (LEXAPRO) 10 mg tablet Take 10 mg by mouth daily      fenofibrate (TRICOR) 48 mg tablet Take 48 mg by mouth daily      loratadine (CLARITIN) 10 mg tablet Take 10 mg by mouth daily      LORazepam (ATIVAN) 0.5 mg tablet Take 0.5 mg by mouth 2 (two) times a day as needed      norethindrone-ethinyl estradiol (Loestrin Fe 1/20) 1-20 MG-MCG per tablet Take 1 tablet by mouth daily 28 tablet 3    sodium chloride (OCEAN) 0.65 % nasal spray 1 spray into each nostril as needed for congestion (as needed for congestion) 15 mL 0    albuterol (2.5 mg/3 mL) 0.083 % nebulizer solution Take 1 vial (2.5 mg total) by nebulization every 6 (six) hours as needed for wheezing or shortness of breath (Patient not taking: Reported on 12/10/2024) 75 mL 0    benzonatate (TESSALON PERLES) 100 mg capsule Take 1 capsule (100 mg total) by mouth every 8 (eight) hours (Patient not taking: Reported on 2/11/2025) 21 capsule 0    guaiFENesin (MUCINEX) 600 mg 12 hr tablet Take 2 tablets (1,200 mg total) by mouth every 12 (twelve) hours (Patient not taking: Reported on 2/11/2025) 20 tablet 0    guaiFENesin (ROBITUSSIN) 100 MG/5ML oral liquid Take 5-10 mL (100-200 mg total) by mouth every 4 (four) hours as needed for cough (Patient not taking: Reported on 2/11/2025) 60 mL 0     No current facility-administered medications on file prior to visit.     Allergies   Allergen Reactions    Cat Dander Other (See Comments)    Dog Epithelium Other (See Comments)    Fruit Extracts Other (See Comments)    Isoflavones (Soy) Other (See Comments)    Peanut (Diagnostic) - Food Allergy Other (See Comments)    Dulera [Mometasone Furo-Formoterol Fum] Rash         Objective   /88  "(BP Location: Left arm, Patient Position: Sitting, Cuff Size: Standard)   Pulse 78   Ht 5' 6\" (1.676 m)   Wt 68.3 kg (150 lb 9.6 oz)   LMP 2025   SpO2 98%   BMI 24.31 kg/m²      Physical Exam  Constitutional:       General: She is not in acute distress.  HENT:      Head: Normocephalic and atraumatic.   Cardiovascular:      Rate and Rhythm: Normal rate.   Pulmonary:      Effort: Pulmonary effort is normal. No respiratory distress.   Abdominal:      General: There is no distension.   Skin:     General: Skin is warm and dry.   Neurological:      General: No focal deficit present.   Psychiatric:         Mood and Affect: Mood normal.         [facility] GYN Department  Surgery Scheduling Sheet    Patient Name: Angelique Vasquez  : 1985    Provider: Karma Claudio MD     Needed: no; Language: N/A    Procedure: exam under anesthesia, all other indicated procedures, and loop electrosurgical excision procedure    Diagnosis: Abnormal pap smear    Special Needs or Equipment: none    Anesthesia: IV sedation with anesthesia    Length of stay: outpatient  Does patient have comorbid conditions that will require close perioperative monitoring prior to safe discharge: no    The patient has comorbid conditions that will require close perioperative monitoring prior to safe discharge, including N/A.   This may require acute care beyond the usual and routine recovery period. As such, inpatient admission post-operatively is expected and appropriate, and anticipated hospital length of stay will be >2 midnights.    Pre-Admission Testing Needed: no   Labs that should be ordered: NA    Order PAT that is recommended in prep for procedure?: Not Indicated    Medical Clearance Needed: no; Provider: N/A    MA Form Signed (tubals/hysterectomy): Not Indicated    Surgical Drink Given: no     How many days out of work: 1 day(s)     How many days no drivin day(s)       Is pre op appt needed?  no  Interval for post op appt: " 2 week(s)

## 2025-02-11 NOTE — ASSESSMENT & PLAN NOTE
- Reviewed ASCCP guidelines with patient that treatment is preferred with her pap smear results of HSIL and HPV 16+  - Discussed that LEEP is both diagnostic and therapeutic  - We reviewed risks and benefits of LEEP including bleeding, infection, inadequate sample requiring additional procedure, or pathology results of cancer requiring further procedure  - Consent signed for EUA, LEEP, all other indicated procedures  - Message sent to surgery scheduler  - Patient is aware she should be NPO after midnight and that this is a same day procedure

## 2025-02-11 NOTE — PROGRESS NOTES
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  - Reviewed ASCCP guidelines with patient that treatment is preferred with her pap smear results of HSIL and HPV 16+  - Discussed that LEEP is both diagnostic and therapeutic  - We reviewed risks and benefits of LEEP including bleeding, infection, inadequate sample requiring additional procedure, or pathology results of cancer requiring further procedure  - Consent signed for EUA, LEEP, all other indicated procedures  - Message sent to surgery scheduler  - Patient is aware she should be NPO after midnight and that this is a same day procedure      History of Present Illness     Angelique Vasquez is a 39 y.o. female who presents for pre-op for LEEP procedure. She has no current complaints or questions.      Review of Systems   Constitutional: Negative.    Respiratory: Negative.     Cardiovascular: Negative.    Gastrointestinal: Negative.    Genitourinary: Negative.    Neurological: Negative.    Psychiatric/Behavioral: Negative.       Past Medical History   Past Medical History:   Diagnosis Date    Anxiety     Asthma     Depression     Hypertension     Seasonal allergies      Past Surgical History:   Procedure Laterality Date    NO PAST SURGERIES       Family History   Problem Relation Age of Onset    Brain cancer Maternal Grandmother     Breast cancer Maternal Aunt       reports that she has been smoking cigarettes. She has never used smokeless tobacco. She reports current alcohol use. She reports that she does not use drugs.  Current Outpatient Medications on File Prior to Visit   Medication Sig Dispense Refill    albuterol (2.5 mg/3 mL) 0.083 % nebulizer solution Take 3 mL (2.5 mg total) by nebulization every 6 (six) hours as needed for wheezing or shortness of breath 75 mL 0     albuterol (PROVENTIL HFA,VENTOLIN HFA) 90 mcg/act inhaler Inhale 2 puffs every 4 (four) hours as needed for wheezing 1 Inhaler 0    amLODIPine (NORVASC) 5 mg tablet Take 5 mg by mouth daily      buPROPion (WELLBUTRIN XL) 150 mg 24 hr tablet Take 1 tablet by mouth every morning      escitalopram (LEXAPRO) 10 mg tablet Take 10 mg by mouth daily      fenofibrate (TRICOR) 48 mg tablet Take 48 mg by mouth daily      loratadine (CLARITIN) 10 mg tablet Take 10 mg by mouth daily      LORazepam (ATIVAN) 0.5 mg tablet Take 0.5 mg by mouth 2 (two) times a day as needed      norethindrone-ethinyl estradiol (Loestrin Fe 1/20) 1-20 MG-MCG per tablet Take 1 tablet by mouth daily 28 tablet 3    sodium chloride (OCEAN) 0.65 % nasal spray 1 spray into each nostril as needed for congestion (as needed for congestion) 15 mL 0    albuterol (2.5 mg/3 mL) 0.083 % nebulizer solution Take 1 vial (2.5 mg total) by nebulization every 6 (six) hours as needed for wheezing or shortness of breath (Patient not taking: Reported on 12/10/2024) 75 mL 0    benzonatate (TESSALON PERLES) 100 mg capsule Take 1 capsule (100 mg total) by mouth every 8 (eight) hours (Patient not taking: Reported on 2/11/2025) 21 capsule 0    guaiFENesin (MUCINEX) 600 mg 12 hr tablet Take 2 tablets (1,200 mg total) by mouth every 12 (twelve) hours (Patient not taking: Reported on 2/11/2025) 20 tablet 0    guaiFENesin (ROBITUSSIN) 100 MG/5ML oral liquid Take 5-10 mL (100-200 mg total) by mouth every 4 (four) hours as needed for cough (Patient not taking: Reported on 2/11/2025) 60 mL 0     No current facility-administered medications on file prior to visit.     Allergies   Allergen Reactions    Cat Dander Other (See Comments)    Dog Epithelium Other (See Comments)    Fruit Extracts Other (See Comments)    Isoflavones (Soy) Other (See Comments)    Peanut (Diagnostic) - Food Allergy Other (See Comments)    Dulera [Mometasone Furo-Formoterol Fum] Rash         Objective   /88  "(BP Location: Left arm, Patient Position: Sitting, Cuff Size: Standard)   Pulse 78   Ht 5' 6\" (1.676 m)   Wt 68.3 kg (150 lb 9.6 oz)   LMP 2025   SpO2 98%   BMI 24.31 kg/m²      Physical Exam  Constitutional:       General: She is not in acute distress.  HENT:      Head: Normocephalic and atraumatic.   Cardiovascular:      Rate and Rhythm: Normal rate.   Pulmonary:      Effort: Pulmonary effort is normal. No respiratory distress.   Abdominal:      General: There is no distension.   Skin:     General: Skin is warm and dry.   Neurological:      General: No focal deficit present.   Psychiatric:         Mood and Affect: Mood normal.         [facility] GYN Department  Surgery Scheduling Sheet    Patient Name: Angelique Vasquez  : 1985    Provider: Karma Claudio MD     Needed: no; Language: N/A    Procedure: exam under anesthesia, all other indicated procedures, and loop electrosurgical excision procedure    Diagnosis: Abnormal pap smear    Special Needs or Equipment: none    Anesthesia: IV sedation with anesthesia    Length of stay: outpatient  Does patient have comorbid conditions that will require close perioperative monitoring prior to safe discharge: no    The patient has comorbid conditions that will require close perioperative monitoring prior to safe discharge, including N/A.   This may require acute care beyond the usual and routine recovery period. As such, inpatient admission post-operatively is expected and appropriate, and anticipated hospital length of stay will be >2 midnights.    Pre-Admission Testing Needed: no   Labs that should be ordered: NA    Order PAT that is recommended in prep for procedure?: Not Indicated    Medical Clearance Needed: no; Provider: N/A    MA Form Signed (tubals/hysterectomy): Not Indicated    Surgical Drink Given: no     How many days out of work: 1 day(s)     How many days no drivin day(s)       Is pre op appt needed?  no  Interval for post op appt: " 2 week(s)

## 2025-02-19 LAB — SL AMB POCT URINE HCG: NEGATIVE

## 2025-02-20 ENCOUNTER — TELEPHONE (OUTPATIENT)
Dept: OTHER | Facility: HOSPITAL | Age: 40
End: 2025-02-20

## 2025-02-20 NOTE — TELEPHONE ENCOUNTER
[facility] Women[facility] Health Surgical Case Review  Date Reviewed: 2/20/2025  Reviewed by: Dr. Stevenson  Case request: EUA, LEEP  Indication: HSIL & positive HPV 16 on pap smear   Surgical Consent: 2/11/2025 MA30/31: N/A    Case request approved: yes    Comments:  Approved for procedure, but will need a colposcopy in the office before the LEEP    Sandro Guerin MD  PGY-1, OBGYN  02/20/25

## 2025-02-25 ENCOUNTER — TELEPHONE (OUTPATIENT)
Dept: OBGYN CLINIC | Facility: CLINIC | Age: 40
End: 2025-02-25

## 2025-02-25 NOTE — TELEPHONE ENCOUNTER
.Called and spoke with pt.   Surgery is now scheduled.   Please see the [facility] OB GYN Department Surgery Scheduling Sheet in this encounter for further information.

## 2025-02-25 NOTE — TELEPHONE ENCOUNTER
---- Message -----  From: Karma Claudio MD  Sent: 2025  11:38 AM EST  To: Rosemarie BRANTLEY Andrea  Subject: Schedule [facility] GYN Department  Surgery Scheduling Sheet    Patient Name: Angelique Vasquez  : 1985    Provider: Dr. Sergio Sawyer / Karma Claudio MD     Needed: no; Language: N/A    Procedure: exam under anesthesia, all other indicated procedures, and loop electrosurgical excision procedure    Diagnosis: Abnormal pap smear    Special Needs or Equipment: none    Anesthesia: IV sedation with anesthesia    Length of stay: outpatient  Does patient have comorbid conditions that will require close perioperative monitoring prior to safe discharge: no    -The patient has comorbid conditions that will require close perioperative monitoring prior to safe discharge, including N/A.   -This may require acute care beyond the usual and routine recovery period. As such, inpatient admission post-operatively is expected and appropriate, and anticipated hospital length of stay will be >2 midnights.    Pre-Admission Testing Needed: no   Labs that should be ordered: urine pregnancy test    Order PAT that is recommended in prep for procedure?: Not Indicated    Medical Clearance Needed: no; Provider: N/A    MA Form Signed (tubals/hysterectomy): Not Indicated    Surgical Drink Given: no     How many days out of work: 1 day(s)     How many days no drivin day(s)       Is pre op appt needed?  no  Interval for post op appt: 2 week(s)       For Surgical Scheduler:     Surgery Scheduled On: FRI. 25-MORNING  [place]: [facility]    Pre-op Appt: COMPLETED ON 25  Post op Appt: 25  Consult/Medical clearance appt:N/A

## 2025-02-27 ENCOUNTER — ANESTHESIA EVENT (OUTPATIENT)
Dept: PERIOP | Facility: HOSPITAL | Age: 40
End: 2025-02-27
Payer: COMMERCIAL

## 2025-03-06 DIAGNOSIS — Z30.011 ENCOUNTER FOR INITIAL PRESCRIPTION OF CONTRACEPTIVE PILLS: ICD-10-CM

## 2025-03-06 RX ORDER — NORETHINDRONE ACETATE AND ETHINYL ESTRADIOL 1MG-20(21)
1 KIT ORAL DAILY
Qty: 28 TABLET | Refills: 0 | Status: SHIPPED | OUTPATIENT
Start: 2025-03-06

## 2025-03-07 ENCOUNTER — ANESTHESIA (OUTPATIENT)
Dept: PERIOP | Facility: HOSPITAL | Age: 40
End: 2025-03-07
Payer: COMMERCIAL

## 2025-03-07 ENCOUNTER — HOSPITAL ENCOUNTER (OUTPATIENT)
Facility: HOSPITAL | Age: 40
Setting detail: OUTPATIENT SURGERY
Discharge: HOME | End: 2025-03-07
Attending: OBSTETRICS & GYNECOLOGY | Admitting: OBSTETRICS & GYNECOLOGY
Payer: COMMERCIAL

## 2025-03-07 VITALS
HEART RATE: 66 BPM | DIASTOLIC BLOOD PRESSURE: 68 MMHG | RESPIRATION RATE: 18 BRPM | SYSTOLIC BLOOD PRESSURE: 122 MMHG | WEIGHT: 147.71 LBS | TEMPERATURE: 97.7 F | OXYGEN SATURATION: 95 % | BODY MASS INDEX: 23.84 KG/M2

## 2025-03-07 DIAGNOSIS — R87.619 ABNORMAL CERVICAL PAPANICOLAOU SMEAR, UNSPECIFIED ABNORMAL PAP FINDING: ICD-10-CM

## 2025-03-07 PROBLEM — F17.200 SMOKER: Status: ACTIVE | Noted: 2025-03-07

## 2025-03-07 LAB
EXT PREGNANCY TEST URINE: NEGATIVE
EXT. CONTROL: NORMAL

## 2025-03-07 PROCEDURE — 57522 CONIZATION OF CERVIX: CPT | Performed by: OBSTETRICS & GYNECOLOGY

## 2025-03-07 PROCEDURE — 81025 URINE PREGNANCY TEST: CPT | Performed by: OBSTETRICS & GYNECOLOGY

## 2025-03-07 PROCEDURE — 88344 IMHCHEM/IMCYTCHM EA MLT ANTB: CPT | Performed by: PATHOLOGY

## 2025-03-07 PROCEDURE — 88307 TISSUE EXAM BY PATHOLOGIST: CPT | Performed by: PATHOLOGY

## 2025-03-07 RX ORDER — KETAMINE HCL IN NACL, ISO-OSM 100MG/10ML
SYRINGE (ML) INJECTION AS NEEDED
Status: DISCONTINUED | OUTPATIENT
Start: 2025-03-07 | End: 2025-03-07

## 2025-03-07 RX ORDER — SODIUM CHLORIDE, SODIUM LACTATE, POTASSIUM CHLORIDE, CALCIUM CHLORIDE 600; 310; 30; 20 MG/100ML; MG/100ML; MG/100ML; MG/100ML
125 INJECTION, SOLUTION INTRAVENOUS CONTINUOUS
Status: DISCONTINUED | OUTPATIENT
Start: 2025-03-07 | End: 2025-03-07 | Stop reason: HOSPADM

## 2025-03-07 RX ORDER — FENTANYL CITRATE/PF 50 MCG/ML
25 SYRINGE (ML) INJECTION
Status: DISCONTINUED | OUTPATIENT
Start: 2025-03-07 | End: 2025-03-07 | Stop reason: HOSPADM

## 2025-03-07 RX ORDER — LIDOCAINE HYDROCHLORIDE 20 MG/ML
INJECTION, SOLUTION EPIDURAL; INFILTRATION; INTRACAUDAL; PERINEURAL AS NEEDED
Status: DISCONTINUED | OUTPATIENT
Start: 2025-03-07 | End: 2025-03-07

## 2025-03-07 RX ORDER — DEXAMETHASONE SODIUM PHOSPHATE 10 MG/ML
INJECTION, SOLUTION INTRAMUSCULAR; INTRAVENOUS AS NEEDED
Status: DISCONTINUED | OUTPATIENT
Start: 2025-03-07 | End: 2025-03-07

## 2025-03-07 RX ORDER — ONDANSETRON 2 MG/ML
4 INJECTION INTRAMUSCULAR; INTRAVENOUS ONCE AS NEEDED
Status: DISCONTINUED | OUTPATIENT
Start: 2025-03-07 | End: 2025-03-07 | Stop reason: HOSPADM

## 2025-03-07 RX ORDER — ACETAMINOPHEN 325 MG/1
975 TABLET ORAL EVERY 6 HOURS PRN
Status: DISCONTINUED | OUTPATIENT
Start: 2025-03-07 | End: 2025-03-07 | Stop reason: HOSPADM

## 2025-03-07 RX ORDER — ACETIC ACID 5 %
LIQUID (ML) MISCELLANEOUS AS NEEDED
Status: DISCONTINUED | OUTPATIENT
Start: 2025-03-07 | End: 2025-03-07 | Stop reason: HOSPADM

## 2025-03-07 RX ORDER — ONDANSETRON 2 MG/ML
INJECTION INTRAMUSCULAR; INTRAVENOUS AS NEEDED
Status: DISCONTINUED | OUTPATIENT
Start: 2025-03-07 | End: 2025-03-07

## 2025-03-07 RX ORDER — MIDAZOLAM HYDROCHLORIDE 2 MG/2ML
INJECTION, SOLUTION INTRAMUSCULAR; INTRAVENOUS AS NEEDED
Status: DISCONTINUED | OUTPATIENT
Start: 2025-03-07 | End: 2025-03-07

## 2025-03-07 RX ORDER — MAGNESIUM HYDROXIDE 1200 MG/15ML
LIQUID ORAL AS NEEDED
Status: DISCONTINUED | OUTPATIENT
Start: 2025-03-07 | End: 2025-03-07 | Stop reason: HOSPADM

## 2025-03-07 RX ORDER — FENTANYL CITRATE 50 UG/ML
INJECTION, SOLUTION INTRAMUSCULAR; INTRAVENOUS AS NEEDED
Status: DISCONTINUED | OUTPATIENT
Start: 2025-03-07 | End: 2025-03-07

## 2025-03-07 RX ORDER — PROPOFOL 10 MG/ML
INJECTION, EMULSION INTRAVENOUS AS NEEDED
Status: DISCONTINUED | OUTPATIENT
Start: 2025-03-07 | End: 2025-03-07

## 2025-03-07 RX ADMIN — FENTANYL CITRATE 50 MCG: 50 INJECTION, SOLUTION INTRAMUSCULAR; INTRAVENOUS at 10:26

## 2025-03-07 RX ADMIN — DEXAMETHASONE SODIUM PHOSPHATE 10 MG: 10 INJECTION INTRAMUSCULAR; INTRAVENOUS at 10:54

## 2025-03-07 RX ADMIN — LIDOCAINE HYDROCHLORIDE 50 MG: 20 INJECTION, SOLUTION EPIDURAL; INFILTRATION; INTRACAUDAL at 10:31

## 2025-03-07 RX ADMIN — Medication 20 MG: at 10:31

## 2025-03-07 RX ADMIN — PROPOFOL 100 MCG/KG/MIN: 10 INJECTION, EMULSION INTRAVENOUS at 10:31

## 2025-03-07 RX ADMIN — MIDAZOLAM 2 MG: 1 INJECTION INTRAMUSCULAR; INTRAVENOUS at 10:26

## 2025-03-07 RX ADMIN — PROPOFOL 50 MG: 10 INJECTION, EMULSION INTRAVENOUS at 10:35

## 2025-03-07 RX ADMIN — FENTANYL CITRATE 25 MCG: 50 INJECTION, SOLUTION INTRAMUSCULAR; INTRAVENOUS at 10:35

## 2025-03-07 RX ADMIN — SODIUM CHLORIDE, SODIUM LACTATE, POTASSIUM CHLORIDE, AND CALCIUM CHLORIDE: .6; .31; .03; .02 INJECTION, SOLUTION INTRAVENOUS at 11:06

## 2025-03-07 RX ADMIN — SODIUM CHLORIDE, SODIUM LACTATE, POTASSIUM CHLORIDE, AND CALCIUM CHLORIDE 125 ML/HR: .6; .31; .03; .02 INJECTION, SOLUTION INTRAVENOUS at 08:33

## 2025-03-07 RX ADMIN — FENTANYL CITRATE 25 MCG: 50 INJECTION, SOLUTION INTRAMUSCULAR; INTRAVENOUS at 10:45

## 2025-03-07 RX ADMIN — ONDANSETRON 4 MG: 2 INJECTION INTRAMUSCULAR; INTRAVENOUS at 10:54

## 2025-03-07 NOTE — ANESTHESIA POSTPROCEDURE EVALUATION
Post-Op Assessment Note    CV Status:  Stable    Pain management: adequate       Mental Status:  Awake   Hydration Status:  Stable   PONV Controlled:  Controlled   Airway Patency:  Patent     Post Op Vitals Reviewed: Yes    No anethesia notable event occurred.    Staff: Anesthesiologist           Last Filed PACU Vitals:  Vitals Value Taken Time   Temp 98.3 °F (36.8 °C) 03/07/25 1145   Pulse 68 03/07/25 1148   /73 03/07/25 1146   Resp 16 03/07/25 1145   SpO2 98 % 03/07/25 1148   Vitals shown include unfiled device data.    Modified Aziza:     Vitals Value Taken Time   Activity 2 03/07/25 1145   Respiration 2 03/07/25 1145   Circulation 2 03/07/25 1145   Consciousness 2 03/07/25 1145   Oxygen Saturation 2 03/07/25 1145     Modified Aziza Score: 10

## 2025-03-07 NOTE — OP NOTE
OPERATIVE REPORT  PATIENT NAME: Angelique Vasquez    :  1985  MRN: [id number]  Pt Location: [facility] OR ROOM 04    SURGERY DATE: 3/7/2025    Surgeons and Role:     * Yardlie Toussaint-Foster, DO - Primary    Preop Diagnosis:  Abnormal cervical Papanicolaou smear, unspecified abnormal pap finding [R87.619]    Post-Op Diagnosis Codes:     * Abnormal cervical Papanicolaou smear, unspecified abnormal pap finding [R87.619]    Procedure(s):  BX LEEP CERVIX; EUA    Specimen(s):  ID Type Source Tests Collected by Time Destination   1 : cervical cone biopsy Tissue Cervix TISSUE EXAM Yardlie Toussaint-Foster, DO 3/7/2025 1051        Estimated Blood Loss:   Minimal    Drains:  * No LDAs found *    Anesthesia Type:   IV Sedation with Anesthesia    Operative Indications:  Abnormal cervical Papanicolaou smear, unspecified abnormal pap finding [R87.619]      Operative Findings:  Normal female genitalia and a parous cervix. White acetic epithelium was noted after application of acetic acid.      Complications:   None    Procedure and Technique:  The patient was taken to the operating room and placed in dorsal supine position.  LMA anesthesia was obtained without difficulty.   The patient was then placed in dorsal lithotomy position.  The patient was prepped and draped in the normal sterile fashion.  The bladder was drained with a red rubber catheter. A coated bivalve speculum was placed in the vagina for visualization.  The cervix was swabbed with acetic acid and white acetic epithelium was noted.  A 20 mm x 15 mm loop electrode was used to form the cone biopsy.  The cervical tissue was removed and handed off the surgical field. The 5 mm ball was then use for coagulation.  There was noted oozing at 6 o'clock position of the cervix despite coagulation.  A figure-of-eight suture of 2-0 vicryl was placed.  Hemostasis was noted.  Monsel was then placed on the surgical site to maintain hemostasis.  All instruments was removed from the  patient's vagina.  Surgical count was correct x 2.  The patient was extubated and taken to PACU in stable condition.       Patient Disposition:  PACU              SIGNATURE: Yardlie Toussaint-Foster, DO  DATE: March 7, 2025  TIME: 11:23 AM

## 2025-03-07 NOTE — DISCHARGE INSTR - AVS FIRST PAGE
Please take the following medications:   Alternate tylenol, motrin       2. Activity restrictions:  Nothing in the vagina until your follow-up visit  No tub baths or swimming     3. You may shower. You may use sitz baths for pain/inflammation and ice packs. You may also use peribottles with water to aid with discomfort during urination and/or bowel movements.     4. If you have heavy vaginal bleeding soaking through more than 1 pad per hour, fever with temperature > 100.4F or 38C, nausea/vomiting, severe abdominal pain, or notice redness or drainage from your incision, please call the office.      5. You have a follow-up appointment listed below.

## 2025-03-07 NOTE — ANESTHESIA POSTPROCEDURE EVALUATION
Post-Op Assessment Note    CV Status:  Stable    Pain management: adequate       Mental Status:  Alert and awake   Hydration Status:  Euvolemic   PONV Controlled:  Controlled   Airway Patency:  Patent     Post Op Vitals Reviewed: Yes      Staff: Anesthesiologist, CRNA           Last Filed PACU Vitals:  Vitals Value Taken Time   Temp     Pulse 80 03/07/25 1117   /81 03/07/25 1116   Resp     SpO2 99 % 03/07/25 1117   Vitals shown include unfiled device data.

## 2025-03-07 NOTE — INTERVAL H&P NOTE
H&P reviewed. After examining the patient I find no changes in the patients condition since the H&P had been written.    Vitals:    03/07/25 0831   BP: 131/87   Pulse: 77   Resp: 16   Temp: (!) 97.4 °F (36.3 °C)   SpO2: 98%     GEN: The patient was alert and oriented x3, pleasant well-appearing female in no acute distress.   CV: Regular rate  PULM: nonlabored respirations  MSK: Normal gait  Skin: warm, dry  Neuro: no focal deficits  Psych: normal affect and judgement, cooperative    Melanie Negrete,    Obstetrics & Gynecology PGY-II  03/07/25  10:02 AM

## 2025-03-07 NOTE — ANESTHESIA PREPROCEDURE EVALUATION
Procedure:  BX LEEP CERVIX; EUA (Cervix)    Relevant Problems   NEURO/PSYCH   (+) Anxiety      PULMONARY   (+) Asthma   (+) Smoker (1 ppd)        Physical Exam    Airway    Mallampati score: II  TM Distance: >3 FB       Dental   No notable dental hx     Cardiovascular  Rhythm: regular, Rate: normal    Pulmonary   Breath sounds clear to auscultation    Other Findings  post-pubertal.      Anesthesia Plan  ASA Score- 2     Anesthesia Type- general with ASA Monitors.         Additional Monitors:     Airway Plan: LMA.    Comment: TIVA/O2 vs GA LMA.       Plan Factors-Exercise tolerance (METS): >4 METS.    Chart reviewed.   Existing labs reviewed.     Patient is a current smoker.  Patient instructed to abstain from smoking on day of procedure. Patient smoked on day of surgery (smoked this AM).    Obstructive sleep apnea risk education given perioperatively.        Induction- intravenous.    Postoperative Plan- Plan for postoperative opioid use.         Informed Consent- Anesthetic plan and risks discussed with patient.        NPO Status:  No vitals data found for the desired time range.

## 2025-03-13 ENCOUNTER — TELEPHONE (OUTPATIENT)
Dept: OBGYN CLINIC | Facility: CLINIC | Age: 40
End: 2025-03-13

## 2025-03-13 PROCEDURE — 88344 IMHCHEM/IMCYTCHM EA MLT ANTB: CPT | Performed by: PATHOLOGY

## 2025-03-13 PROCEDURE — 88307 TISSUE EXAM BY PATHOLOGIST: CPT | Performed by: PATHOLOGY

## 2025-03-24 PROBLEM — Z98.890 STATUS POST LEEP (LOOP ELECTROSURGICAL EXCISION PROCEDURE) OF CERVIX: Status: ACTIVE | Noted: 2025-03-24

## 2025-03-24 NOTE — PROGRESS NOTES
Name: Angelique Vasquez      : 1985      MRN: [id number]  Encounter Provider: Karma Claudio MD  Encounter Date: 3/25/2025   Encounter department: [facility]  :  Assessment & Plan  Status post LEEP (loop electrosurgical excision procedure) of cervix  Pap smear 12/10/24: HSIL, HPV 16 positive  Patient now s/p LEEP on 3/7/25  Patient reports doing well and denies bleeding, abnormal discharge, fevers, chills  Reviewed pathology results with patient showing: CIN2-3, margins negative for dysplasia  Per ASCCP guidelines, pap smear with HPV cotesting at 6 months (due 25) recommended      History of Present Illness   HPI  Angelique Vasquez is a 39 y.o. female who presents for follow up after LEEP on 3/7/25. She reports she is doing well. She had some spotting immediately after her procedure and then did get her period the week following but is now not bleeding, denies pain, denies abnormal or foul smelling discharge.       Review of Systems   Constitutional:  Negative for chills and fever.   HENT: Negative.     Respiratory:  Negative for cough and shortness of breath.    Cardiovascular:  Negative for chest pain.   Gastrointestinal:  Negative for abdominal pain, nausea and vomiting.   Genitourinary: Negative.  Negative for vaginal bleeding.   Musculoskeletal: Negative.    Neurological: Negative.    Psychiatric/Behavioral: Negative.       Past Medical History   Past Medical History:   Diagnosis Date    Anxiety     Asthma     Depression     Hypertension     Seasonal allergies      Past Surgical History:   Procedure Laterality Date    NO PAST SURGERIES      [place] CONIZATION CERVIX W/WO D&C RPR ELTRD EXC N/A 3/7/2025    Procedure: BX LEEP CERVIX; EUA;  Surgeon: Yardlie Toussaint-Foster, DO;  Location: [facility];  Service: Gynecology     Family History   Problem Relation Age of Onset    Brain cancer Maternal Grandmother     Breast cancer Maternal Aunt       reports that she has been smoking  cigarettes. She has never used smokeless tobacco. She reports current alcohol use. She reports that she does not use drugs.  Current Outpatient Medications   Medication Instructions    albuterol (PROVENTIL HFA,VENTOLIN HFA) 90 mcg/act inhaler 2 puffs, Inhalation, Every 4 hours PRN    albuterol 2.5 mg, Nebulization, Every 6 hours PRN    albuterol 2.5 mg, Nebulization, Every 6 hours PRN    amLODIPine (NORVASC) 5 mg, Daily    benzonatate (TESSALON PERLES) 100 mg, Oral, Every 8 hours    buPROPion (WELLBUTRIN XL) 150 mg 24 hr tablet 1 tablet, Every morning    escitalopram (LEXAPRO) 10 mg, Daily    fenofibrate (TRICOR) 48 mg, Daily    guaiFENesin (MUCINEX) 1,200 mg, Oral, Every 12 hours scheduled    guaiFENesin (ROBITUSSIN) 100-200 mg, Oral, Every 4 hours PRN    loratadine (CLARITIN) 10 mg, Daily    LORazepam (ATIVAN) 0.5 mg, 2 times daily PRN    norethindrone-ethinyl estradiol (Blisovi FE 1/20) 1-20 MG-MCG per tablet 1 tablet, Oral, Daily    sodium chloride (OCEAN) 0.65 % nasal spray 1 spray, Nasal, As needed     Allergies   Allergen Reactions    Cat Dander Other (See Comments)    Dog Epithelium Other (See Comments)    Fruit Extracts Other (See Comments)    Isoflavones (Soy) Other (See Comments)    Peanut (Diagnostic) - Food Allergy Other (See Comments)    Dulera [Mometasone Furo-Formoterol Fum] Rash      Current Outpatient Medications on File Prior to Visit   Medication Sig Dispense Refill    albuterol (2.5 mg/3 mL) 0.083 % nebulizer solution Take 1 vial (2.5 mg total) by nebulization every 6 (six) hours as needed for wheezing or shortness of breath 75 mL 0    albuterol (2.5 mg/3 mL) 0.083 % nebulizer solution Take 3 mL (2.5 mg total) by nebulization every 6 (six) hours as needed for wheezing or shortness of breath 75 mL 0    albuterol (PROVENTIL HFA,VENTOLIN HFA) 90 mcg/act inhaler Inhale 2 puffs every 4 (four) hours as needed for wheezing 1 Inhaler 0    amLODIPine (NORVASC) 5 mg tablet Take 5 mg by mouth daily       "buPROPion (WELLBUTRIN XL) 150 mg 24 hr tablet Take 1 tablet by mouth every morning      escitalopram (LEXAPRO) 10 mg tablet Take 10 mg by mouth daily      fenofibrate (TRICOR) 48 mg tablet Take 48 mg by mouth daily      loratadine (CLARITIN) 10 mg tablet Take 10 mg by mouth daily      LORazepam (ATIVAN) 0.5 mg tablet Take 0.5 mg by mouth 2 (two) times a day as needed      norethindrone-ethinyl estradiol (Blisovi FE 1/20) 1-20 MG-MCG per tablet TAKE 1 TABLET BY MOUTH DAILY 28 tablet 0    benzonatate (TESSALON PERLES) 100 mg capsule Take 1 capsule (100 mg total) by mouth every 8 (eight) hours (Patient not taking: Reported on 12/10/2024) 21 capsule 0    guaiFENesin (MUCINEX) 600 mg 12 hr tablet Take 2 tablets (1,200 mg total) by mouth every 12 (twelve) hours (Patient not taking: Reported on 12/10/2024) 20 tablet 0    guaiFENesin (ROBITUSSIN) 100 MG/5ML oral liquid Take 5-10 mL (100-200 mg total) by mouth every 4 (four) hours as needed for cough (Patient not taking: Reported on 12/10/2024) 60 mL 0    sodium chloride (OCEAN) 0.65 % nasal spray 1 spray into each nostril as needed for congestion (as needed for congestion) 15 mL 0     No current facility-administered medications on file prior to visit.      Social History     Tobacco Use    Smoking status: Every Day     Types: Cigarettes    Smokeless tobacco: Never    Tobacco comments:     Smoke a pack a day   Vaping Use    Vaping status: Never Used   Substance and Sexual Activity    Alcohol use: Yes     Comment: social    Drug use: Never    Sexual activity: Yes     Partners: Male     Birth control/protection: Pill        Objective   /70 (BP Location: Left arm, Patient Position: Sitting)   Ht 5' 6\" (1.676 m)   Wt 67.9 kg (149 lb 12.8 oz)   LMP 02/12/2025 (Exact Date)   BMI 24.18 kg/m²      Physical Exam  Constitutional:       General: She is not in acute distress.  HENT:      Head: Normocephalic and atraumatic.   Cardiovascular:      Rate and Rhythm: Normal rate. "   Pulmonary:      Effort: Pulmonary effort is normal. No respiratory distress.   Abdominal:      General: There is no distension.   Skin:     General: Skin is warm and dry.   Neurological:      General: No focal deficit present.   Psychiatric:         Mood and Affect: Mood normal.

## 2025-03-24 NOTE — ASSESSMENT & PLAN NOTE
Pap smear 12/10/24: HSIL, HPV 16 positive  Patient now s/p LEEP on 3/7/25  Patient reports doing well and denies bleeding, abnormal discharge, fevers, chills  Reviewed pathology results with patient showing: CIN2-3, margins negative for dysplasia  Per ASCCP guidelines, pap smear with HPV cotesting at 6 months (due 9/7/25) recommended

## 2025-03-25 ENCOUNTER — OFFICE VISIT (OUTPATIENT)
Dept: OBGYN CLINIC | Facility: CLINIC | Age: 40
End: 2025-03-25

## 2025-03-25 VITALS
SYSTOLIC BLOOD PRESSURE: 116 MMHG | DIASTOLIC BLOOD PRESSURE: 70 MMHG | BODY MASS INDEX: 24.08 KG/M2 | HEIGHT: 66 IN | WEIGHT: 149.8 LBS

## 2025-03-25 DIAGNOSIS — Z98.890 STATUS POST LEEP (LOOP ELECTROSURGICAL EXCISION PROCEDURE) OF CERVIX: Primary | ICD-10-CM

## 2025-03-25 PROCEDURE — 99024 POSTOP FOLLOW-UP VISIT: CPT | Performed by: OBSTETRICS & GYNECOLOGY

## 2025-04-08 ENCOUNTER — TELEPHONE (OUTPATIENT)
Dept: OBGYN CLINIC | Facility: CLINIC | Age: 40
End: 2025-04-08

## 2025-06-18 ENCOUNTER — TELEPHONE (OUTPATIENT)
Dept: OBGYN CLINIC | Facility: CLINIC | Age: 40
End: 2025-06-18

## (undated) DEVICE — GLOVE PI ULTRA TOUCH SZ.6.5

## (undated) DEVICE — SCD SEQUENTIAL COMPRESSION COMFORT SLEEVE MEDIUM KNEE LENGTH: Brand: KENDALL SCD

## (undated) DEVICE — NEEDLE SPINAL 22G X 3.5IN  QUINCKE

## (undated) DEVICE — ELECTRODE BALL E-Z CLEAN 5 IN -0009

## (undated) DEVICE — SMOKE EVACUATION TUBING WITH 7/8 IN TO 1/4 IN REDUCER: Brand: BUFFALO FILTER

## (undated) DEVICE — GLOVE INDICATOR PI UNDERGLOVE SZ 7 BLUE

## (undated) DEVICE — ANTIBACTERIAL UNDYED BRAIDED (POLYGLACTIN 910), SYNTHETIC ABSORBABLE SUTURE: Brand: COATED VICRYL

## (undated) DEVICE — MEDI-VAC YANKAUER SUCTION HANDLE W/BULBOUS AND CONTROL VENT: Brand: CARDINAL HEALTH

## (undated) DEVICE — SPECIMEN CONTAINER STERILE PEEL PACK

## (undated) DEVICE — GLOVE PI ULTRA TOUCH SZ 6

## (undated) DEVICE — GLOVE PI ULTRA TOUCH SZ.7.5

## (undated) DEVICE — SINGLE PORT MANIFOLD: Brand: NEPTUNE 2

## (undated) DEVICE — PVC URETHRAL CATHETER: Brand: DOVER

## (undated) DEVICE — PREMIUM DRY TRAY LF: Brand: MEDLINE INDUSTRIES, INC.

## (undated) DEVICE — TUNGSTEN LOOP ELECTRODE: Brand: VALLEYLAB

## (undated) DEVICE — BETHLEHEM UNIVERSAL MINOR VAG: Brand: CARDINAL HEALTH

## (undated) DEVICE — GLOVE INDICATOR PI UNDERGLOVE SZ 8 BLUE

## (undated) DEVICE — PENCILETTE PUSH BUTTON COATED

## (undated) DEVICE — TUBING SUCTION 5MM X 12 FT

## (undated) DEVICE — ABDOMINAL PAD: Brand: DERMACEA

## (undated) DEVICE — STERILE 8 INCH PROCTO SWAB: Brand: CARDINAL HEALTH

## (undated) DEVICE — GLOVE INDICATOR PI UNDERGLOVE SZ 6.5 BLUE

## (undated) DEVICE — ASTOUND STANDARD SURGICAL GOWN, XL: Brand: CONVERTORS

## (undated) DEVICE — SYRINGE 10ML LL CONTROL TOP